# Patient Record
Sex: FEMALE | Race: WHITE | Employment: OTHER | URBAN - METROPOLITAN AREA
[De-identification: names, ages, dates, MRNs, and addresses within clinical notes are randomized per-mention and may not be internally consistent; named-entity substitution may affect disease eponyms.]

---

## 2019-01-09 ENCOUNTER — OFFICE VISIT (OUTPATIENT)
Dept: NEUROLOGY | Age: 69
End: 2019-01-09

## 2019-01-09 VITALS
HEIGHT: 60 IN | DIASTOLIC BLOOD PRESSURE: 84 MMHG | RESPIRATION RATE: 20 BRPM | BODY MASS INDEX: 42.21 KG/M2 | HEART RATE: 84 BPM | SYSTOLIC BLOOD PRESSURE: 136 MMHG | OXYGEN SATURATION: 99 % | WEIGHT: 215 LBS

## 2019-01-09 DIAGNOSIS — F07.81 POST CONCUSSIVE SYNDROME: Primary | ICD-10-CM

## 2019-01-09 DIAGNOSIS — Z86.79 HISTORY OF SUBDURAL HEMATOMA: ICD-10-CM

## 2019-01-09 RX ORDER — BUPROPION HYDROCHLORIDE 150 MG/1
150 TABLET ORAL
COMMUNITY

## 2019-01-09 RX ORDER — TOPIRAMATE 50 MG/1
100 TABLET, FILM COATED ORAL DAILY
Qty: 60 TAB | Refills: 5 | Status: SHIPPED | OUTPATIENT
Start: 2019-01-09 | End: 2019-01-11 | Stop reason: SDUPTHER

## 2019-01-09 RX ORDER — HYDROCODONE BITARTRATE AND ACETAMINOPHEN 5; 325 MG/1; MG/1
1 TABLET ORAL
COMMUNITY
Start: 2018-11-06

## 2019-01-09 RX ORDER — BUPROPION HYDROCHLORIDE 300 MG/1
300 TABLET ORAL
COMMUNITY

## 2019-01-09 RX ORDER — DICLOFENAC SODIUM 10 MG/G
GEL TOPICAL
COMMUNITY
Start: 2018-01-02

## 2019-01-09 RX ORDER — METHYLPREDNISOLONE 4 MG/1
TABLET ORAL
Qty: 1 DOSE PACK | Refills: 1 | Status: SHIPPED | OUTPATIENT
Start: 2019-01-09

## 2019-01-09 NOTE — PROGRESS NOTES
Chief Complaint Patient presents with  Concussion  
  fell in [address] [place] on Dec 27  
 
1. Have you been to the ER, urgent care clinic since your last visit? Hospitalized since your last visit? No 
 
2. Have you seen or consulted any other health care providers outside of the [address] [place] [address] since your last visit? Include any pap smears or colon screening. No  
 
Visit Vitals /84 Pulse 84 Resp 20 Ht 5' (1.524 m) Wt 97.5 kg (215 lb) SpO2 99% BMI 41.99 kg/m²

## 2019-01-09 NOTE — LETTER
Dear Amanda Cisneros MD, Thank you for allowing me to see your patient, Gavin Moran for a neurological consultation. Please see my impression and recommendations as outlined in my note. Sincerely, Alexa Sage MD 
[facility] at [address] [facility] BY: 
Amanda Cisneros MD 
 
 
CHIEF COMPLAINT: 
concussion HISTORY OF PRESENT ILLNESS HISTORY PROVIDED BY: 
Patient Family Member: yes Gavin Moran is a 80 y.o. female who I am asked to see in consultation for severe headaches and concussion. She had a fall on Dec 27th when she fell over a parking bumper and hit the back of her head. She had a hematoma but didn't lose consciousness. She was diaganosed with concussion. She had CT head and CT C spine that were negative. She denies neck pain but definite headache. She is not a typical headache person. Her headache is frontal. She is having a constant headache. She did try fioricet but it didn't help. She can get to sleep and is okay but as soon as she is up she has some headache. She does have some irritability. No memory issues. No sleep issues. Some difficulty with screens. She is retired. She doesn't have any history of concussion. No other medications changes. She is meds for HTN, HLD, and seasonal allergies. She follows with psychiatry and is on several meds for this. She does have bipolar. She is on lamictal, geodon, buproprion, and cymbalta She has robaxin to take as needed. She denies vision changes. She denies any focal numbness or weakness. She does have a history of subdural hematoma. PMH Past Medical History:  
Diagnosis Date  Allergic rhinitis  Aneurysm ([facility] [address] 1971  
 subdural hematoma; \"DON'T KNOW WHAT CAUSED IT\", PT STATES  Arrhythmia   
 pt unsure of rhythm; DIAGNOSED IN 1990s  Arthritis   
 osteo  Asthma   
 no longer has per pt  Chronic kidney disease from taking celebrex per pt   \"LEVEL 3\"  Chronic obstructive pulmonary disease ([facility] [address] 7/7/15 BRONCHITIS \"A COUPLE TIMES A YEAR\", ASSOCIATED WITH ALLERGIES, PT STATES  Chronic pain  Fibromyalgia  Genital herpes  GERD (gastroesophageal reflux disease)  History of cardiac arrhythmia  Hyperlipidemia  Intertrigo  Morbid obesity ([facility] [address] 7/7/15 LOST 180 LBS OVER 9 YEARS AFTER LAP BAND SURGERY; CURRENT  LB  
 Nocturia  Severe depression ([facility] [address] BIPOLAR     
 SOB (shortness of breath) on exertion RESOLVED, PT STATES ON 7/7/15 [address] Social History Socioeconomic History  Marital status:  Spouse name: Not on file  Number of children: Not on file  Years of education: Not on file  Highest education level: Not on file Tobacco Use  Smoking status: Never Smoker  Smokeless tobacco: Never Used Substance and Sexual Activity  Alcohol use: Yes Comment: RARE GLASS OF WINE  
 Drug use: No  
 
 
FH Family History Problem Relation Age of Onset  Heart Failure Father CHF  Heart defect Father MITRAL VALVE \"PROBLEM\"  Breast Cancer Sister  Anesth Problems Neg Hx   
 Other Mother PHLEBITIS PROGRESSED TO SEPSIS, PT STATES-MOTHER  OF ALLERGIES Allergies Allergen Reactions  Latex Rash  Amoxicillin Itching  Codeine Nausea and Vomiting  Dilantin [Phenytoin Sodium Extended] Rash  Sulfa (Sulfonamide Antibiotics) Rash CURRENT MEDS Current Outpatient Medications Medication Sig Dispense Refill  furosemide (LASIX) 20 mg tablet Take  by mouth daily.  oxyCODONE-acetaminophen (PERCOCET) 5-325 mg per tablet Take 1 Tab by mouth every six (6) hours as needed for Pain. Max Daily Amount: 4 Tabs. 30 Tab 0  
 CYANOCOBALAMIN, VITAMIN B-12, (VITAMIN B-12 PO) Take 1,000 mg by mouth daily.  ASCORBATE CALCIUM (VITAMIN C PO) Take 1,000 mg by mouth daily.  CHOLECALCIFEROL, VITAMIN D3, (VITAMIN D3 PO) Take  by mouth daily.  polyethylene glycol (MIRALAX) 17 gram/dose powder Take 17 g by mouth two (2) times a day. 1530 g 5  
 omeprazole (PRILOSEC) 40 mg capsule Take 1 Cap by mouth daily. 30 Cap 11  
 CYMBALTA 60 mg capsule 120 mg nightly.  lamoTRIgine (LAMICTAL) 100 mg tablet 200 mg two (2) times a day.  methocarbamol (ROBAXIN) 500 mg tablet 500 mg two (2) times daily as needed.  SINGULAIR 10 mg tablet 10 mg daily. Indications: ALLERGIC RHINITIS  rOPINIRole (REQUIP) 0.25 mg tablet 0.75 mg nightly. 3 tabs  verapamil (CALAN) 80 mg tablet 160 mg two (2) times a day. 2 tabs BID  GEODON 60 mg capsule 60 mg two (2) times daily (with meals).  atorvastatin (LIPITOR) 10 mg tablet Take 10 mg by mouth nightly.  buPROPion SR (WELLBUTRIN SR) 200 mg SR tablet Take 200 mg by mouth daily.  CALCIUM CARBONATE (CALTRATE 600 PO) Take 1 Tab by mouth two (2) times a day. With vitamin D = 40 IU each chew.  fexofenadine (ALLEGRA) 180 mg tablet Take 180 mg by mouth every evening. Buproprion XL 450mg daily REVIEW OF SYSTEMS:  
 
Y  N       Y  N  Y  N   Y  N 
[] [x] AIDS          [x] [] Falls  [] [x] Memory Loss  [] [x]  Shortness of breath 
[] [x] Anxiety          [] [x] Fatigue [] [x] Muscle Pain        [] [x]  Skipped beats 
[] [x] Chest Pain   [x] [] Frequent HA [] [x] Ms Weakness     [] [x]  Snoring 
[] [x] Constipation [] [x]Hearing loss [] [x] Nause/Vomiting  [] [x]  Stomach Pain 
[] [x] Cough          [] [x]Hepatitis [] [x] Neuropathy         [] [x]  Swallowing difficulty 
[] [x] Depression  [] [x]Incontinence [] [x] Poor appetite      [] [x]  Vertigo 
[] [x] Diarrhea       [] [x] Joint Pain [] [x] Rash                   [] [x]  Visual disturbances 
[] [x] Fainting        [] [x] Leg Swelling [] [x] Ringing ears       [] [x]  Weight changes []Unable to obtain  ROS due to  []mental status change  []sedated []intubated PREVIOUS WORKUP IMAGING: CT head negative per report provided LABS Results for orders placed or performed during the hospital encounter of 07/09/15 CBC WITH AUTOMATED DIFF Result Value Ref Range WBC 28.3 (H) 3.6 - 11.0 K/uL  
 RBC 3.31 (L) 3.80 - 5.20 M/uL HGB 9.6 (L) 11.5 - 16.0 g/dL HCT 29.2 (L) 35.0 - 47.0 % MCV 88.2 80.0 - 99.0 FL  
 MCH 29.0 26.0 - 34.0 PG  
 MCHC 32.9 30.0 - 36.5 g/dL  
 RDW 14.0 11.5 - 14.5 % PLATELET 775 461 - 615 K/uL NEUTROPHILS 89 (H) 32 - 75 % LYMPHOCYTES 5 (L) 12 - 49 % MONOCYTES 6 5 - 13 % EOSINOPHILS 0 0 - 7 % BASOPHILS 0 0 - 1 %  
 ABS. NEUTROPHILS 25.2 (H) 1.8 - 8.0 K/UL  
 ABS. LYMPHOCYTES 1.4 0.8 - 3.5 K/UL  
 ABS. MONOCYTES 1.7 (H) 0.0 - 1.0 K/UL  
 ABS. EOSINOPHILS 0.0 0.0 - 0.4 K/UL  
 ABS. BASOPHILS 0.0 0.0 - 0.1 K/UL  
 DF MANUAL    
 RBC COMMENTS ANISOCYTOSIS 
1+ METABOLIC PANEL, BASIC Result Value Ref Range Sodium 137 136 - 145 mmol/L Potassium 5.0 3.5 - 5.1 mmol/L Chloride 107 97 - 108 mmol/L  
 CO2 23 21 - 32 mmol/L Anion gap 7 5 - 15 mmol/L Glucose 135 (H) 65 - 100 mg/dL BUN 19 6 - 20 MG/DL Creatinine 1.02 0.45 - 1.15 MG/DL  
 BUN/Creatinine ratio 19 12 - 20 GFR est AA >60 >60 ml/min/1.73m2 GFR est non-AA 55 (L) >60 ml/min/1.73m2 Calcium 7.4 (L) 8.5 - 10.1 MG/DL  
CBC W/O DIFF Result Value Ref Range WBC 21.0 (H) 3.6 - 11.0 K/uL  
 RBC 2.30 (L) 3.80 - 5.20 M/uL HGB 6.8 (L) 11.5 - 16.0 g/dL HCT 20.3 (L) 35.0 - 47.0 % MCV 88.3 80.0 - 99.0 FL  
 MCH 29.6 26.0 - 34.0 PG  
 MCHC 33.5 30.0 - 36.5 g/dL  
 RDW 14.7 (H) 11.5 - 14.5 % PLATELET 502 212 - 141 K/uL CBC WITH AUTOMATED DIFF Result Value Ref Range WBC 18.8 (H) 3.6 - 11.0 K/uL  
 RBC 2.82 (L) 3.80 - 5.20 M/uL HGB 8.5 (L) 11.5 - 16.0 g/dL HCT 24.8 (L) 35.0 - 47.0 % MCV 87.9 80.0 - 99.0 FL  
 MCH 30.1 26.0 - 34.0 PG  
 MCHC 34.3 30.0 - 36.5 g/dL  
 RDW 15.0 (H) 11.5 - 14.5 % PLATELET 983 789 - 249 K/uL NEUTROPHILS 81 (H) 32 - 75 % BAND NEUTROPHILS 2 0 - 6 % LYMPHOCYTES 7 (L) 12 - 49 % MONOCYTES 10 5 - 13 % EOSINOPHILS 0 0 - 7 % BASOPHILS 0 0 - 1 %  
 ABS. NEUTROPHILS 15.6 (H) 1.8 - 8.0 K/UL  
 ABS. LYMPHOCYTES 1.3 0.8 - 3.5 K/UL  
 ABS. MONOCYTES 1.9 (H) 0.0 - 1.0 K/UL  
 ABS. EOSINOPHILS 0.0 0.0 - 0.4 K/UL  
 ABS. BASOPHILS 0.0 0.0 - 0.1 K/UL  
 DF MANUAL    
 RBC COMMENTS ANISOCYTOSIS 
1+ WBC COMMENTS DOHLE BODIES    
HEMOGLOBIN Result Value Ref Range HGB 7.9 (L) 11.5 - 16.0 g/dL MAGNESIUM Result Value Ref Range Magnesium 1.2 (L) 1.6 - 2.4 mg/dL PHOSPHORUS Result Value Ref Range Phosphorus 3.2 2.6 - 4.7 MG/DL  
METABOLIC PANEL, BASIC Result Value Ref Range Sodium 135 (L) 136 - 145 mmol/L Potassium 4.8 3.5 - 5.1 mmol/L Chloride 105 97 - 108 mmol/L  
 CO2 24 21 - 32 mmol/L Anion gap 6 5 - 15 mmol/L Glucose 111 (H) 65 - 100 mg/dL BUN 21 (H) 6 - 20 MG/DL Creatinine 1.19 (H) 0.45 - 1.15 MG/DL  
 BUN/Creatinine ratio 18 12 - 20 GFR est AA 56 (L) >60 ml/min/1.73m2 GFR est non-AA 46 (L) >60 ml/min/1.73m2 Calcium 7.7 (L) 8.5 - 10.1 MG/DL  
POC EG7 Result Value Ref Range Calcium, ionized (POC) 1.28 1.12 - 1.32 mmol/L  
 FIO2 (POC) 0.21 % pH (POC) 7.228 (LL) 7.35 - 7.45    
 pCO2 (POC) 51.6 (H) 35.0 - 45.0 MMHG  
 pO2 (POC) 24 (LL) 80 - 100 MMHG  
 HCO3 (POC) 21.5 (L) 22 - 26 MMOL/L Base deficit (POC) 6 mmol/L  
 sO2 (POC) 32 (L) 92 - 97 % Site OTHER Device: ROOM AIR Allens test (POC) N/A Specimen type (POC) VENOUS BLOOD    
CBC W/O DIFF Result Value Ref Range WBC 18.7 (H) 3.6 - 11.0 K/uL  
 RBC 2.61 (L) 3.80 - 5.20 M/uL HGB 7.7 (L) 11.5 - 16.0 g/dL HCT 23.3 (L) 35.0 - 47.0 % MCV 89.3 80.0 - 99.0 FL  
 MCH 29.5 26.0 - 34.0 PG  
 MCHC 33.0 30.0 - 36.5 g/dL  
 RDW 15.2 (H) 11.5 - 14.5 % PLATELET 518 371 - 009 K/uL METABOLIC PANEL, BASIC Result Value Ref Range Sodium 136 136 - 145 mmol/L Potassium 4.5 3.5 - 5.1 mmol/L Chloride 105 97 - 108 mmol/L  
 CO2 24 21 - 32 mmol/L Anion gap 7 5 - 15 mmol/L Glucose 81 65 - 100 mg/dL BUN 15 6 - 20 MG/DL Creatinine 0.97 0.45 - 1.15 MG/DL  
 BUN/Creatinine ratio 15 12 - 20 GFR est AA >60 >60 ml/min/1.73m2 GFR est non-AA 58 (L) >60 ml/min/1.73m2 Calcium 7.9 (L) 8.5 - 10.1 MG/DL MAGNESIUM Result Value Ref Range Magnesium 2.0 1.6 - 2.4 mg/dL HGB & HCT Result Value Ref Range HGB 8.7 (L) 11.5 - 16.0 g/dL HCT 26.2 (L) 35.0 - 47.0 % TYPE & CROSSMATCH Result Value Ref Range Crossmatch Expiration 07/12/2015 ABO/Rh(D) O POSITIVE Antibody screen NEG Unit number [id number] Blood component type RC LR AS1 Unit division 00 Status of unit TRANSFUSED Crossmatch result Compatible Unit number [id number] Blood component type RC LR AS3,2 Unit division 00 Status of unit TRANSFUSED Crossmatch result Compatible Unit number [id number] Blood component type RC LR AS1 Unit division 00 Status of unit REL FROM [facility] Crossmatch result Compatible Unit number [id number] Blood component type RC LR AS3,1 Unit division 00 Status of unit TRANSFUSED Crossmatch result Compatible PHYSICAL EXAM 
Visit Vitals /84 Pulse 84 Resp 20 Ht 5' (1.524 m) Wt 97.5 kg (215 lb) SpO2 99% BMI 41.99 kg/m² General:  Alert, cooperative, no distress. Head:  Normocephalic, without obvious abnormality, atraumatic. Eyes:  Conjunctivae/corneas clear. Pupils equal, round, reactive to light. Extraocular movements intact, VFF, NO papilledema Lungs: 
Heart:   Non labored breathing Regular rate and rhythm, no carotid bruits Abdomen:   Soft, non-distended Extremities: Extremities normal, atraumatic, no cyanosis or edema. Pulses: 2+ and symmetric all extremities. Skin: Skin color, texture, turgor normal. No rashes or lesions. Neurologic:  Gen: Attention normal 
           Language: naming, repetition, fluency normal 
           Memory: intact recent and remote memory Cranial Nerves: 
I: smell Not tested II: visual fields Full to confrontation II: pupils Equal, round, reactive to light II: optic disc No papilledema III,VII: ptosis none III,IV,VI: extraocular muscles  Full ROM V: mastication normal  
V: facial light touch sensation  normal  
VII: facial muscle function   symmetric VIII: hearing symmetric IX: soft palate elevation  normal  
XI: trapezius strength  5/5 XI: sternocleidomastoid strength 5/5 XI: neck flexion strength  5/5 XII: tongue  midline Motor: normal bulk and tone, no tremor Strength: 5/5 all four extremities Sensory: intact to LT, PP, vibration, and temperature Coordination: FTN intact Gait: Slightly wide-based Reflexes: 2+ throughout IMPRESSION Marisolnicol Pinzon is a 80 y.o. female who presents for evaluation of severe headache status post a fall. I suspect she is postconcussive syndrome. However given her increase in frequency of headache as well as history of subdural I would like to do a repeat CT head to evaluate for this. Additionally will start patient on preventative with Topamax. Typically would use amitriptyline but she is already on multiple psychiatric medications and I do not want to add this to her regimen. Discussed with patient we will likely wean that off at follow-up. We will also try Cambia and Medrol Dosepak for abortive of her current headache. RECOMMENDATIONS Visit Diagnoses Post concussive syndrome    -  Primary Relevant Medications · Medrol Dosepak x2 given · topiramate (TOPAMAX) 50 mg tabletpatient instructed to start at 25 mg and titrate to 100 mg · Diclofenac Potassium (CAMBIA) 50 mg pwpk samples given Other Relevant Orders · CT HEAD WO CONT · Postconcussive education provided History of subdural hematoma Relevant Orders · CT HEAD WO CONT  
  
 
 
FU in 3 months with Dr. Paul Elise MD 
 
CC: Jaclyn Nieves MD/ Fax: [phone] Medications and side effects discussed with patient in detail. With any new medications prescribed, patient was given instructions on administration and side effects. Written medication information was provided to the patient as well. This note was created using voice recognition software. Despite editing, there may be syntax errors. This note will not be viewable in [address] Chief Complaint Patient presents with  Concussion  
  fell in [address] [place] on Dec 27  
 
1. Have you been to the ER, urgent care clinic since your last visit? Hospitalized since your last visit? No 
 
2. Have you seen or consulted any other health care providers outside of the [address] [place] [address] since your last visit? Include any pap smears or colon screening. No  
 
Visit Vitals /84 Pulse 84 Resp 20 Ht 5' (1.524 m) Wt 97.5 kg (215 lb) SpO2 99% BMI 41.99 kg/m²

## 2019-01-09 NOTE — PROGRESS NOTES
NEUROLOGY NEW PATIENT CONSULTATION 
 
REFERRED BY: 
Buelah Cranker, MD 
 
 
CHIEF COMPLAINT: 
concussion HISTORY OF PRESENT ILLNESS HISTORY PROVIDED BY: 
Patient Family Member: yes Audrey Cardoso is a 80 y.o. female who I am asked to see in consultation for severe headaches and concussion. She had a fall on Dec 27th when she fell over a parking bumper and hit the back of her head. She had a hematoma but didn't lose consciousness. She was diaganosed with concussion. She had CT head and CT C spine that were negative. She denies neck pain but definite headache. She is not a typical headache person. Her headache is frontal. She is having a constant headache. She did try fioricet but it didn't help. She can get to sleep and is okay but as soon as she is up she has some headache. She does have some irritability. No memory issues. No sleep issues. Some difficulty with screens. She is retired. She doesn't have any history of concussion. No other medications changes. She is meds for HTN, HLD, and seasonal allergies. She follows with psychiatry and is on several meds for this. She does have bipolar. She is on lamictal, geodon, buproprion, and cymbalta She has robaxin to take as needed. She denies vision changes. She denies any focal numbness or weakness. She does have a history of subdural hematoma. PMH Past Medical History:  
Diagnosis Date  Allergic rhinitis  Aneurysm ([facility] [address] 1971  
 subdural hematoma; \"DON'T KNOW WHAT CAUSED IT\", PT STATES  Arrhythmia   
 pt unsure of rhythm; DIAGNOSED IN 1990s  Arthritis   
 osteo  Asthma   
 no longer has per pt  Chronic kidney disease   
 from taking celebrex per pt   \"LEVEL 3\"  Chronic obstructive pulmonary disease ([facility] [address] 7/7/15 BRONCHITIS \"A COUPLE TIMES A YEAR\", ASSOCIATED WITH ALLERGIES, PT STATES  Chronic pain  Fibromyalgia  Genital herpes  GERD (gastroesophageal reflux disease)  History of cardiac arrhythmia  Hyperlipidemia  Intertrigo  Morbid obesity ([facility] [address] 7/7/15 LOST 180 LBS OVER 9 YEARS AFTER LAP BAND SURGERY; CURRENT  LB  
 Nocturia  Severe depression ([facility] [address] BIPOLAR     
 SOB (shortness of breath) on exertion RESOLVED, PT STATES ON 7/7/15 31 Karma Brown Social History Socioeconomic History  Marital status:  Spouse name: Not on file  Number of children: Not on file  Years of education: Not on file  Highest education level: Not on file Tobacco Use  Smoking status: Never Smoker  Smokeless tobacco: Never Used Substance and Sexual Activity  Alcohol use: Yes Comment: RARE GLASS OF WINE  
 Drug use: No  
 
 
FH Family History Problem Relation Age of Onset  Heart Failure Father CHF  Heart defect Father MITRAL VALVE \"PROBLEM\"  Breast Cancer Sister  Anesth Problems Neg Hx   
 Other Mother PHLEBITIS PROGRESSED TO SEPSIS, PT STATES-MOTHER  OF ALLERGIES Allergies Allergen Reactions  Latex Rash  Amoxicillin Itching  Codeine Nausea and Vomiting  Dilantin [Phenytoin Sodium Extended] Rash  Sulfa (Sulfonamide Antibiotics) Rash CURRENT MEDS Current Outpatient Medications Medication Sig Dispense Refill  furosemide (LASIX) 20 mg tablet Take  by mouth daily.  oxyCODONE-acetaminophen (PERCOCET) 5-325 mg per tablet Take 1 Tab by mouth every six (6) hours as needed for Pain. Max Daily Amount: 4 Tabs. 30 Tab 0  
 CYANOCOBALAMIN, VITAMIN B-12, (VITAMIN B-12 PO) Take 1,000 mg by mouth daily.  ASCORBATE CALCIUM (VITAMIN C PO) Take 1,000 mg by mouth daily.  CHOLECALCIFEROL, VITAMIN D3, (VITAMIN D3 PO) Take  by mouth daily.  polyethylene glycol (MIRALAX) 17 gram/dose powder Take 17 g by mouth two (2) times a day. 1530 g 5  
 omeprazole (PRILOSEC) 40 mg capsule Take 1 Cap by mouth daily.  30 Cap 11  
  CYMBALTA 60 mg capsule 120 mg nightly.  lamoTRIgine (LAMICTAL) 100 mg tablet 200 mg two (2) times a day.  methocarbamol (ROBAXIN) 500 mg tablet 500 mg two (2) times daily as needed.  SINGULAIR 10 mg tablet 10 mg daily. Indications: ALLERGIC RHINITIS  rOPINIRole (REQUIP) 0.25 mg tablet 0.75 mg nightly. 3 tabs  verapamil (CALAN) 80 mg tablet 160 mg two (2) times a day. 2 tabs BID  GEODON 60 mg capsule 60 mg two (2) times daily (with meals).  atorvastatin (LIPITOR) 10 mg tablet Take 10 mg by mouth nightly.  buPROPion SR (WELLBUTRIN SR) 200 mg SR tablet Take 200 mg by mouth daily.  CALCIUM CARBONATE (CALTRATE 600 PO) Take 1 Tab by mouth two (2) times a day. With vitamin D = 40 IU each chew.  fexofenadine (ALLEGRA) 180 mg tablet Take 180 mg by mouth every evening. Buproprion XL 450mg daily REVIEW OF SYSTEMS:  
 
Y  N       Y  N  Y  N   Y  N 
[] [x] AIDS          [x] [] Falls  [] [x] Memory Loss  [] [x]  Shortness of breath 
[] [x] Anxiety          [] [x] Fatigue [] [x] Muscle Pain        [] [x]  Skipped beats 
[] [x] Chest Pain   [x] [] Frequent HA [] [x] Ms Weakness     [] [x]  Snoring 
[] [x] Constipation [] [x]Hearing loss [] [x] Nause/Vomiting  [] [x]  Stomach Pain 
[] [x] Cough          [] [x]Hepatitis [] [x] Neuropathy         [] [x]  Swallowing difficulty 
[] [x] Depression  [] [x]Incontinence [] [x] Poor appetite      [] [x]  Vertigo 
[] [x] Diarrhea       [] [x] Joint Pain [] [x] Rash                   [] [x]  Visual disturbances 
[] [x] Fainting        [] [x] Leg Swelling [] [x] Ringing ears       [] [x]  Weight changes []Unable to obtain  ROS due to  []mental status change  []sedated   []intubated PREVIOUS WORKUP IMAGING: CT head negative per report provided LABS Results for orders placed or performed during the hospital encounter of 07/09/15 CBC WITH AUTOMATED DIFF Result Value Ref Range WBC 28.3 (H) 3.6 - 11.0 K/uL RBC 3.31 (L) 3.80 - 5.20 M/uL HGB 9.6 (L) 11.5 - 16.0 g/dL HCT 29.2 (L) 35.0 - 47.0 % MCV 88.2 80.0 - 99.0 FL  
 MCH 29.0 26.0 - 34.0 PG  
 MCHC 32.9 30.0 - 36.5 g/dL  
 RDW 14.0 11.5 - 14.5 % PLATELET 036 353 - 437 K/uL NEUTROPHILS 89 (H) 32 - 75 % LYMPHOCYTES 5 (L) 12 - 49 % MONOCYTES 6 5 - 13 % EOSINOPHILS 0 0 - 7 % BASOPHILS 0 0 - 1 %  
 ABS. NEUTROPHILS 25.2 (H) 1.8 - 8.0 K/UL  
 ABS. LYMPHOCYTES 1.4 0.8 - 3.5 K/UL  
 ABS. MONOCYTES 1.7 (H) 0.0 - 1.0 K/UL  
 ABS. EOSINOPHILS 0.0 0.0 - 0.4 K/UL  
 ABS. BASOPHILS 0.0 0.0 - 0.1 K/UL  
 DF MANUAL    
 RBC COMMENTS ANISOCYTOSIS 
1+ METABOLIC PANEL, BASIC Result Value Ref Range Sodium 137 136 - 145 mmol/L Potassium 5.0 3.5 - 5.1 mmol/L Chloride 107 97 - 108 mmol/L  
 CO2 23 21 - 32 mmol/L Anion gap 7 5 - 15 mmol/L Glucose 135 (H) 65 - 100 mg/dL BUN 19 6 - 20 MG/DL Creatinine 1.02 0.45 - 1.15 MG/DL  
 BUN/Creatinine ratio 19 12 - 20 GFR est AA >60 >60 ml/min/1.73m2 GFR est non-AA 55 (L) >60 ml/min/1.73m2 Calcium 7.4 (L) 8.5 - 10.1 MG/DL  
CBC W/O DIFF Result Value Ref Range WBC 21.0 (H) 3.6 - 11.0 K/uL  
 RBC 2.30 (L) 3.80 - 5.20 M/uL HGB 6.8 (L) 11.5 - 16.0 g/dL HCT 20.3 (L) 35.0 - 47.0 % MCV 88.3 80.0 - 99.0 FL  
 MCH 29.6 26.0 - 34.0 PG  
 MCHC 33.5 30.0 - 36.5 g/dL  
 RDW 14.7 (H) 11.5 - 14.5 % PLATELET 601 980 - 571 K/uL CBC WITH AUTOMATED DIFF Result Value Ref Range WBC 18.8 (H) 3.6 - 11.0 K/uL  
 RBC 2.82 (L) 3.80 - 5.20 M/uL HGB 8.5 (L) 11.5 - 16.0 g/dL HCT 24.8 (L) 35.0 - 47.0 % MCV 87.9 80.0 - 99.0 FL  
 MCH 30.1 26.0 - 34.0 PG  
 MCHC 34.3 30.0 - 36.5 g/dL  
 RDW 15.0 (H) 11.5 - 14.5 % PLATELET 109 221 - 546 K/uL NEUTROPHILS 81 (H) 32 - 75 % BAND NEUTROPHILS 2 0 - 6 % LYMPHOCYTES 7 (L) 12 - 49 % MONOCYTES 10 5 - 13 % EOSINOPHILS 0 0 - 7 % BASOPHILS 0 0 - 1 %  
 ABS. NEUTROPHILS 15.6 (H) 1.8 - 8.0 K/UL ABS. LYMPHOCYTES 1.3 0.8 - 3.5 K/UL  
 ABS. MONOCYTES 1.9 (H) 0.0 - 1.0 K/UL  
 ABS. EOSINOPHILS 0.0 0.0 - 0.4 K/UL  
 ABS. BASOPHILS 0.0 0.0 - 0.1 K/UL  
 DF MANUAL    
 RBC COMMENTS ANISOCYTOSIS 
1+ WBC COMMENTS DOHLE BODIES    
HEMOGLOBIN Result Value Ref Range HGB 7.9 (L) 11.5 - 16.0 g/dL MAGNESIUM Result Value Ref Range Magnesium 1.2 (L) 1.6 - 2.4 mg/dL PHOSPHORUS Result Value Ref Range Phosphorus 3.2 2.6 - 4.7 MG/DL  
METABOLIC PANEL, BASIC Result Value Ref Range Sodium 135 (L) 136 - 145 mmol/L Potassium 4.8 3.5 - 5.1 mmol/L Chloride 105 97 - 108 mmol/L  
 CO2 24 21 - 32 mmol/L Anion gap 6 5 - 15 mmol/L Glucose 111 (H) 65 - 100 mg/dL BUN 21 (H) 6 - 20 MG/DL Creatinine 1.19 (H) 0.45 - 1.15 MG/DL  
 BUN/Creatinine ratio 18 12 - 20 GFR est AA 56 (L) >60 ml/min/1.73m2 GFR est non-AA 46 (L) >60 ml/min/1.73m2 Calcium 7.7 (L) 8.5 - 10.1 MG/DL  
POC EG7 Result Value Ref Range Calcium, ionized (POC) 1.28 1.12 - 1.32 mmol/L  
 FIO2 (POC) 0.21 % pH (POC) 7.228 (LL) 7.35 - 7.45    
 pCO2 (POC) 51.6 (H) 35.0 - 45.0 MMHG  
 pO2 (POC) 24 (LL) 80 - 100 MMHG  
 HCO3 (POC) 21.5 (L) 22 - 26 MMOL/L Base deficit (POC) 6 mmol/L  
 sO2 (POC) 32 (L) 92 - 97 % Site OTHER Device: ROOM AIR Allens test (POC) N/A Specimen type (POC) VENOUS BLOOD    
CBC W/O DIFF Result Value Ref Range WBC 18.7 (H) 3.6 - 11.0 K/uL  
 RBC 2.61 (L) 3.80 - 5.20 M/uL HGB 7.7 (L) 11.5 - 16.0 g/dL HCT 23.3 (L) 35.0 - 47.0 % MCV 89.3 80.0 - 99.0 FL  
 MCH 29.5 26.0 - 34.0 PG  
 MCHC 33.0 30.0 - 36.5 g/dL  
 RDW 15.2 (H) 11.5 - 14.5 % PLATELET 934 496 - 603 K/uL METABOLIC PANEL, BASIC Result Value Ref Range Sodium 136 136 - 145 mmol/L Potassium 4.5 3.5 - 5.1 mmol/L Chloride 105 97 - 108 mmol/L  
 CO2 24 21 - 32 mmol/L Anion gap 7 5 - 15 mmol/L Glucose 81 65 - 100 mg/dL BUN 15 6 - 20 MG/DL  Creatinine 0.97 0.45 - 1.15 MG/DL  
 BUN/Creatinine ratio 15 12 - 20 GFR est AA >60 >60 ml/min/1.73m2 GFR est non-AA 58 (L) >60 ml/min/1.73m2 Calcium 7.9 (L) 8.5 - 10.1 MG/DL MAGNESIUM Result Value Ref Range Magnesium 2.0 1.6 - 2.4 mg/dL HGB & HCT Result Value Ref Range HGB 8.7 (L) 11.5 - 16.0 g/dL HCT 26.2 (L) 35.0 - 47.0 % TYPE & CROSSMATCH Result Value Ref Range Crossmatch Expiration 07/12/2015 ABO/Rh(D) O POSITIVE Antibody screen NEG Unit number [id number] Blood component type RC LR AS1 Unit division 00 Status of unit TRANSFUSED Crossmatch result Compatible Unit number [id number] Blood component type RC LR AS3,2 Unit division 00 Status of unit TRANSFUSED Crossmatch result Compatible Unit number [id number] Blood component type RC LR AS1 Unit division 00 Status of unit REL FROM [facility] Crossmatch result Compatible Unit number [id number] Blood component type RC LR AS3,1 Unit division 00 Status of unit TRANSFUSED Crossmatch result Compatible PHYSICAL EXAM 
Visit Vitals /84 Pulse 84 Resp 20 Ht 5' (1.524 m) Wt 97.5 kg (215 lb) SpO2 99% BMI 41.99 kg/m² General:  Alert, cooperative, no distress. Head:  Normocephalic, without obvious abnormality, atraumatic. Eyes:  Conjunctivae/corneas clear. Pupils equal, round, reactive to light. Extraocular movements intact, VFF, NO papilledema Lungs: 
Heart:   Non labored breathing Regular rate and rhythm, no carotid bruits Abdomen:   Soft, non-distended Extremities: Extremities normal, atraumatic, no cyanosis or edema. Pulses: 2+ and symmetric all extremities. Skin: Skin color, texture, turgor normal. No rashes or lesions. Neurologic:  Gen: Attention normal 
           Language: naming, repetition, fluency normal 
           Memory: intact recent and remote memory Cranial Nerves: 
I: smell Not tested II: visual fields Full to confrontation II: pupils Equal, round, reactive to light II: optic disc No papilledema III,VII: ptosis none III,IV,VI: extraocular muscles  Full ROM V: mastication normal  
V: facial light touch sensation  normal  
VII: facial muscle function   symmetric VIII: hearing symmetric IX: soft palate elevation  normal  
XI: trapezius strength  5/5 XI: sternocleidomastoid strength 5/5 XI: neck flexion strength  5/5 XII: tongue  midline Motor: normal bulk and tone, no tremor Strength: 5/5 all four extremities Sensory: intact to LT, PP, vibration, and temperature Coordination: FTN intact Gait: Slightly wide-based Reflexes: 2+ throughout IMPRESSION Cindy Martinez is a 80 y.o. female who presents for evaluation of severe headache status post a fall. I suspect she is postconcussive syndrome. However given her increase in frequency of headache as well as history of subdural I would like to do a repeat CT head to evaluate for this. Additionally will start patient on preventative with Topamax. Typically would use amitriptyline but she is already on multiple psychiatric medications and I do not want to add this to her regimen. Discussed with patient we will likely wean that off at follow-up. We will also try Cambia and Medrol Dosepak for abortive of her current headache. RECOMMENDATIONS Visit Diagnoses Post concussive syndrome    -  Primary Relevant Medications · Medrol Dosepak x2 given · topiramate (TOPAMAX) 50 mg tabletpatient instructed to start at 25 mg and titrate to 100 mg · Diclofenac Potassium (CAMBIA) 50 mg pwpk samples given Other Relevant Orders · CT HEAD WO CONT · Postconcussive education provided History of subdural hematoma Relevant Orders · CT HEAD WO CONT  
  
 
 
FU in 3 months with Dr. Avie Bibber Mellie Aschoff, MD 
 
CC: Chuck Busch MD/ Fax: [phone] Medications and side effects discussed with patient in detail. With any new medications prescribed, patient was given instructions on administration and side effects. Written medication information was provided to the patient as well. This note was created using voice recognition software. Despite editing, there may be syntax errors. This note will not be viewable in [address]

## 2019-01-09 NOTE — PATIENT INSTRUCTIONS
PRESCRIPTION REFILL POLICY [facility] Statement to Patients April 1, 2014 In an effort to ensure the large volume of patient prescription refills is processed in the most efficient and expeditious manner, we are asking our patients to assist us by calling your Pharmacy for all prescription refills, this will include also your  Mail Order Pharmacy. The pharmacy will contact our office electronically to continue the refill process. Please do not wait until the last minute to call your pharmacy. We need at least 48 hours (2days) to fill prescriptions. We also encourage you to call your pharmacy before going to  your prescription to make sure it is ready. With regard to controlled substance prescription refill requests (narcotic refills) that need to be picked up at our office, we ask your cooperation by providing us with at least 72 hours (3days) notice that you will need a refill. We will not refill narcotic prescription refill requests after 4:00pm on any weekday, Monday through Thursday, or after 2:00pm on Fridays, or on the weekends. We encourage everyone to explore another way of getting your prescription refill request processed using [employer], our patient web portal through our electronic medical record system. [employer] is an efficient and effective way to communicate your medication request directly to the office and  downloadable as an wm on your smart phone . [employer] also features a review functionality that allows you to view your medication list as well as leave messages for your physician. Are you ready to get connected? If so please review the attatched instructions or speak to any of our staff to get you set up right away! Thank you so much for your cooperation. Should you have any questions please contact our Practice Administrator. The Physicians and Staff,  [facility] Patient Instruction Plan/ Result Policy If we have ordered testing for you, know that; \"NO NEWS IS GOOD NEWS! \" It is our policy that we know longer call patients with results, nor do we  give test results over the phone. We schedule follow up appointments so that your results can be discussed in person. This allows you to address any questions you have regarding the results. If something of concern is revealed on your test, we will contact you to discuss the matter and if needed schedule a sooner follow up appointment. Additionally, results may be found by using the My Chart feature and one of our patient service representatives at the  can give you instructions on how to access this feature to utilize our electronic medical record system. Thank you for your understanding. Patient Instructions/Plans: 
· Please take Cambia as needed for headache · Please take a steroid dose pack for one week and refill x 1 if needed · Please start TOPAMAX as follows: Topamax 25mg tabs Take one tab at night for one week then Take two tabs at night for one week then Take three tabs at night for one week then Take four tabs at night If you have issues with this medication or need an increase in the dosage please call the office for further instructions Postconcussion Syndrome: Care Instructions Your Care Instructions Postconcussion syndrome occurs after a blow to the head or body. Common symptoms are changes in the ability to concentrate, think, remember, or solve problems. Symptoms, which may include headaches, personality changes, and dizziness, may be related to stress from the events surrounding the accident that caused the injury. Follow-up care is a key part of your treatment and safety. Be sure to make and go to all appointments, and call your doctor if you are having problems. It's also a good idea to know your test results and keep a list of the medicines you take. How can you care for yourself at home? Pain · Rest is the best treatment for postconcussion syndrome. · Do not drive if you have taken a prescription pain medicine. · Rest in a quiet, dark room until your headache is gone. Close your eyes and try to relax or go to sleep. Do not watch TV or read. · Put a cold, moist cloth or cold pack on the painful area for 10 to 20 minutes at a time. Put a thin cloth between the cold pack and your skin. · Have someone gently massage your neck and shoulders. · Take your medicines exactly as prescribed. Call your doctor if you think you are having a problem with your medicine. You will get more details on the specific medicines your doctor prescribes. Stress · Try to reduce stress. Some ways to do this include: ? Taking slow, deep breaths. ? Soaking in a warm bath. ? Listening to soothing music. ? Taking a yoga class. ? Having a massage or back rub. ? Drinking a warm, nonalcoholic, noncaffeinated beverage. · Get enough sleep. · Eat a healthy, balanced diet. A balanced diet includes whole grains, dairy, fruits and vegetables, and protein. Eat a variety of foods from each of those groups so you get all the nutrients you need. · Avoid alcohol and illegal drugs. · Try relaxation exercises, such as breathing and muscle relaxation exercises. · Talk to your doctor about counseling. It may help you deal with stress from your accident. When should you call for help? Watch closely for changes in your health, and be sure to contact your doctor if: 
  · You do not get better as expected.  
  · Your symptoms, such as headaches, trouble concentrating, or changes in mood, get worse. Where can you learn more? Go to http://zakia-fran.info/. Enter H341 in the search box to learn more about \"Postconcussion Syndrome: Care Instructions. \" Current as of: September 10, 2017 Content Version: 11.8 © 0433-8102 [employer].  Care instructions adapted under license by [address] (which disclaims liability or warranty for this information). If you have questions about a medical condition or this instruction, always ask your healthcare professional. [address] any warranty or liability for your use of this information.

## 2019-01-11 RX ORDER — TOPIRAMATE 50 MG/1
100 TABLET, FILM COATED ORAL DAILY
Qty: 180 TAB | Refills: 1 | Status: SHIPPED | OUTPATIENT
Start: 2019-01-11

## 2024-09-08 ENCOUNTER — APPOINTMENT (OUTPATIENT)
Facility: HOSPITAL | Age: 74
End: 2024-09-08
Payer: MEDICARE

## 2024-09-08 ENCOUNTER — HOSPITAL ENCOUNTER (INPATIENT)
Facility: HOSPITAL | Age: 74
LOS: 5 days | Discharge: NURSING, REHAB OR LONG-TERM CARE | End: 2024-09-13
Attending: EMERGENCY MEDICINE | Admitting: INTERNAL MEDICINE
Payer: MEDICARE

## 2024-09-08 DIAGNOSIS — N39.0 ACUTE UTI: Primary | ICD-10-CM

## 2024-09-08 DIAGNOSIS — R26.2 AMBULATORY DYSFUNCTION: ICD-10-CM

## 2024-09-08 LAB
ALBUMIN SERPL-MCNC: 2.7 G/DL (ref 3.5–5)
ALBUMIN/GLOB SERPL: 0.8 (ref 1.1–2.2)
ALP SERPL-CCNC: 88 U/L (ref 45–117)
ALT SERPL-CCNC: 15 U/L (ref 12–78)
ANION GAP SERPL CALC-SCNC: 8 MMOL/L (ref 2–12)
APPEARANCE UR: ABNORMAL
AST SERPL-CCNC: 8 U/L (ref 15–37)
BACTERIA URNS QL MICRO: ABNORMAL /HPF
BASOPHILS # BLD: 0.2 K/UL (ref 0–0.1)
BASOPHILS NFR BLD: 1 % (ref 0–1)
BILIRUB SERPL-MCNC: 0.4 MG/DL (ref 0.2–1)
BILIRUB UR QL: NEGATIVE
BUN SERPL-MCNC: 14 MG/DL (ref 6–20)
BUN/CREAT SERPL: 10 (ref 12–20)
CALCIUM SERPL-MCNC: 9.5 MG/DL (ref 8.5–10.1)
CHLORIDE SERPL-SCNC: 105 MMOL/L (ref 97–108)
CO2 SERPL-SCNC: 24 MMOL/L (ref 21–32)
COLOR UR: ABNORMAL
CREAT SERPL-MCNC: 1.35 MG/DL (ref 0.55–1.02)
DIFFERENTIAL METHOD BLD: ABNORMAL
EOSINOPHIL # BLD: 0.3 K/UL (ref 0–0.4)
EOSINOPHIL NFR BLD: 2 % (ref 0–7)
EPITH CASTS URNS QL MICRO: ABNORMAL /LPF
ERYTHROCYTE [DISTWIDTH] IN BLOOD BY AUTOMATED COUNT: 14.3 % (ref 11.5–14.5)
GLOBULIN SER CALC-MCNC: 3.5 G/DL (ref 2–4)
GLUCOSE SERPL-MCNC: 98 MG/DL (ref 65–100)
GLUCOSE UR STRIP.AUTO-MCNC: NEGATIVE MG/DL
HCT VFR BLD AUTO: 31.8 % (ref 35–47)
HGB BLD-MCNC: 10.1 G/DL (ref 11.5–16)
HGB UR QL STRIP: ABNORMAL
IMM GRANULOCYTES # BLD AUTO: 0 K/UL (ref 0–0.04)
IMM GRANULOCYTES NFR BLD AUTO: 0 % (ref 0–0.5)
KETONES UR QL STRIP.AUTO: ABNORMAL MG/DL
LEUKOCYTE ESTERASE UR QL STRIP.AUTO: ABNORMAL
LYMPHOCYTES # BLD: 2.6 K/UL (ref 0.8–3.5)
LYMPHOCYTES NFR BLD: 16 % (ref 12–49)
MCH RBC QN AUTO: 29.1 PG (ref 26–34)
MCHC RBC AUTO-ENTMCNC: 31.8 G/DL (ref 30–36.5)
MCV RBC AUTO: 91.6 FL (ref 80–99)
MONOCYTES # BLD: 2.1 K/UL (ref 0–1)
MONOCYTES NFR BLD: 13 % (ref 5–13)
NEUTS SEG # BLD: 10.8 K/UL (ref 1.8–8)
NEUTS SEG NFR BLD: 68 % (ref 32–75)
NITRITE UR QL STRIP.AUTO: NEGATIVE
NRBC # BLD: 0 K/UL (ref 0–0.01)
NRBC BLD-RTO: 0 PER 100 WBC
PH UR STRIP: 5.5 (ref 5–8)
PLATELET # BLD AUTO: 296 K/UL (ref 150–400)
PMV BLD AUTO: 10.5 FL (ref 8.9–12.9)
POTASSIUM SERPL-SCNC: 3.9 MMOL/L (ref 3.5–5.1)
PROT SERPL-MCNC: 6.2 G/DL (ref 6.4–8.2)
PROT UR STRIP-MCNC: 300 MG/DL
RBC # BLD AUTO: 3.47 M/UL (ref 3.8–5.2)
RBC #/AREA URNS HPF: ABNORMAL /HPF (ref 0–5)
RBC MORPH BLD: ABNORMAL
SODIUM SERPL-SCNC: 137 MMOL/L (ref 136–145)
SP GR UR REFRACTOMETRY: 1.02
URINE CULTURE IF INDICATED: ABNORMAL
UROBILINOGEN UR QL STRIP.AUTO: 1 EU/DL (ref 0.2–1)
VANCOMYCIN SERPL-MCNC: 8.1 UG/ML
WBC # BLD AUTO: 16 K/UL (ref 3.6–11)
WBC URNS QL MICRO: >100 /HPF (ref 0–4)

## 2024-09-08 PROCEDURE — 36415 COLL VENOUS BLD VENIPUNCTURE: CPT

## 2024-09-08 PROCEDURE — 2580000003 HC RX 258: Performed by: EMERGENCY MEDICINE

## 2024-09-08 PROCEDURE — 80053 COMPREHEN METABOLIC PANEL: CPT

## 2024-09-08 PROCEDURE — 87040 BLOOD CULTURE FOR BACTERIA: CPT

## 2024-09-08 PROCEDURE — 85025 COMPLETE CBC W/AUTO DIFF WBC: CPT

## 2024-09-08 PROCEDURE — 80202 ASSAY OF VANCOMYCIN: CPT

## 2024-09-08 PROCEDURE — 71045 X-RAY EXAM CHEST 1 VIEW: CPT

## 2024-09-08 PROCEDURE — 87088 URINE BACTERIA CULTURE: CPT

## 2024-09-08 PROCEDURE — 87186 SC STD MICRODIL/AGAR DIL: CPT

## 2024-09-08 PROCEDURE — 6360000002 HC RX W HCPCS: Performed by: INTERNAL MEDICINE

## 2024-09-08 PROCEDURE — 99285 EMERGENCY DEPT VISIT HI MDM: CPT

## 2024-09-08 PROCEDURE — 2580000003 HC RX 258: Performed by: INTERNAL MEDICINE

## 2024-09-08 PROCEDURE — 87086 URINE CULTURE/COLONY COUNT: CPT

## 2024-09-08 PROCEDURE — 70450 CT HEAD/BRAIN W/O DYE: CPT

## 2024-09-08 PROCEDURE — 6360000002 HC RX W HCPCS: Performed by: EMERGENCY MEDICINE

## 2024-09-08 PROCEDURE — 1100000000 HC RM PRIVATE

## 2024-09-08 PROCEDURE — 6370000000 HC RX 637 (ALT 250 FOR IP): Performed by: INTERNAL MEDICINE

## 2024-09-08 PROCEDURE — 81001 URINALYSIS AUTO W/SCOPE: CPT

## 2024-09-08 RX ORDER — POTASSIUM CHLORIDE 1500 MG/1
40 TABLET, EXTENDED RELEASE ORAL PRN
Status: DISCONTINUED | OUTPATIENT
Start: 2024-09-08 | End: 2024-09-08

## 2024-09-08 RX ORDER — HYDRALAZINE HYDROCHLORIDE 20 MG/ML
10 INJECTION INTRAMUSCULAR; INTRAVENOUS EVERY 6 HOURS PRN
Status: DISCONTINUED | OUTPATIENT
Start: 2024-09-08 | End: 2024-09-13 | Stop reason: HOSPADM

## 2024-09-08 RX ORDER — SODIUM CHLORIDE 9 MG/ML
INJECTION, SOLUTION INTRAVENOUS PRN
Status: DISCONTINUED | OUTPATIENT
Start: 2024-09-08 | End: 2024-09-13 | Stop reason: HOSPADM

## 2024-09-08 RX ORDER — DULOXETIN HYDROCHLORIDE 30 MG/1
60 CAPSULE, DELAYED RELEASE ORAL EVERY EVENING
Status: DISCONTINUED | OUTPATIENT
Start: 2024-09-08 | End: 2024-09-09

## 2024-09-08 RX ORDER — POTASSIUM CHLORIDE 7.45 MG/ML
10 INJECTION INTRAVENOUS PRN
Status: DISCONTINUED | OUTPATIENT
Start: 2024-09-08 | End: 2024-09-08

## 2024-09-08 RX ORDER — ONDANSETRON 2 MG/ML
4 INJECTION INTRAMUSCULAR; INTRAVENOUS EVERY 6 HOURS PRN
Status: DISCONTINUED | OUTPATIENT
Start: 2024-09-08 | End: 2024-09-13 | Stop reason: HOSPADM

## 2024-09-08 RX ORDER — ONDANSETRON 2 MG/ML
4 INJECTION INTRAMUSCULAR; INTRAVENOUS EVERY 6 HOURS PRN
Status: DISCONTINUED | OUTPATIENT
Start: 2024-09-08 | End: 2024-09-08 | Stop reason: SDUPTHER

## 2024-09-08 RX ORDER — SODIUM CHLORIDE 0.9 % (FLUSH) 0.9 %
5-40 SYRINGE (ML) INJECTION EVERY 12 HOURS SCHEDULED
Status: DISCONTINUED | OUTPATIENT
Start: 2024-09-08 | End: 2024-09-13 | Stop reason: HOSPADM

## 2024-09-08 RX ORDER — ONDANSETRON 4 MG/1
4 TABLET, ORALLY DISINTEGRATING ORAL EVERY 8 HOURS PRN
Status: DISCONTINUED | OUTPATIENT
Start: 2024-09-08 | End: 2024-09-13 | Stop reason: HOSPADM

## 2024-09-08 RX ORDER — POLYETHYLENE GLYCOL 3350 17 G/17G
17 POWDER, FOR SOLUTION ORAL DAILY PRN
Status: DISCONTINUED | OUTPATIENT
Start: 2024-09-08 | End: 2024-09-13 | Stop reason: HOSPADM

## 2024-09-08 RX ORDER — ZIPRASIDONE HYDROCHLORIDE 60 MG/1
40 CAPSULE ORAL NIGHTLY
Status: ON HOLD | COMMUNITY
Start: 2011-12-30 | End: 2024-09-09

## 2024-09-08 RX ORDER — ACETAMINOPHEN 325 MG/1
650 TABLET ORAL EVERY 4 HOURS PRN
Status: DISCONTINUED | OUTPATIENT
Start: 2024-09-08 | End: 2024-09-08 | Stop reason: SDUPTHER

## 2024-09-08 RX ORDER — SODIUM CHLORIDE 0.9 % (FLUSH) 0.9 %
5-40 SYRINGE (ML) INJECTION PRN
Status: DISCONTINUED | OUTPATIENT
Start: 2024-09-08 | End: 2024-09-13 | Stop reason: HOSPADM

## 2024-09-08 RX ORDER — ACETAMINOPHEN 650 MG/1
650 SUPPOSITORY RECTAL EVERY 6 HOURS PRN
Status: DISCONTINUED | OUTPATIENT
Start: 2024-09-08 | End: 2024-09-13 | Stop reason: HOSPADM

## 2024-09-08 RX ORDER — VERAPAMIL HYDROCHLORIDE 80 MG/1
80 TABLET ORAL EVERY 12 HOURS SCHEDULED
Status: DISCONTINUED | OUTPATIENT
Start: 2024-09-08 | End: 2024-09-13 | Stop reason: HOSPADM

## 2024-09-08 RX ORDER — RISPERIDONE 0.25 MG/1
0.25 TABLET ORAL DAILY
Status: DISCONTINUED | OUTPATIENT
Start: 2024-09-08 | End: 2024-09-09

## 2024-09-08 RX ORDER — ATORVASTATIN CALCIUM 10 MG/1
10 TABLET, FILM COATED ORAL NIGHTLY
Status: DISCONTINUED | OUTPATIENT
Start: 2024-09-08 | End: 2024-09-09

## 2024-09-08 RX ORDER — VANCOMYCIN 750 MG/150ML
750 INJECTION, SOLUTION INTRAVENOUS
Status: ON HOLD | COMMUNITY
Start: 2024-09-06 | End: 2024-09-13 | Stop reason: HOSPADM

## 2024-09-08 RX ORDER — ACETAMINOPHEN 325 MG/1
650 TABLET ORAL EVERY 6 HOURS PRN
Status: DISCONTINUED | OUTPATIENT
Start: 2024-09-08 | End: 2024-09-13 | Stop reason: HOSPADM

## 2024-09-08 RX ORDER — ENOXAPARIN SODIUM 100 MG/ML
40 INJECTION SUBCUTANEOUS DAILY
Status: DISCONTINUED | OUTPATIENT
Start: 2024-09-08 | End: 2024-09-13 | Stop reason: HOSPADM

## 2024-09-08 RX ADMIN — CEFTRIAXONE 1000 MG: 1 INJECTION, POWDER, FOR SOLUTION INTRAMUSCULAR; INTRAVENOUS at 04:50

## 2024-09-08 RX ADMIN — AZITHROMYCIN DIHYDRATE 500 MG: 500 INJECTION, POWDER, LYOPHILIZED, FOR SOLUTION INTRAVENOUS at 09:27

## 2024-09-08 RX ADMIN — SODIUM CHLORIDE, PRESERVATIVE FREE 10 ML: 5 INJECTION INTRAVENOUS at 21:24

## 2024-09-08 RX ADMIN — SODIUM CHLORIDE, PRESERVATIVE FREE 10 ML: 5 INJECTION INTRAVENOUS at 21:21

## 2024-09-08 RX ADMIN — VERAPAMIL HYDROCHLORIDE 80 MG: 80 TABLET ORAL at 09:27

## 2024-09-08 RX ADMIN — DULOXETINE HYDROCHLORIDE 60 MG: 30 CAPSULE, DELAYED RELEASE ORAL at 21:20

## 2024-09-08 RX ADMIN — VANCOMYCIN HYDROCHLORIDE 750 MG: 750 INJECTION, POWDER, LYOPHILIZED, FOR SOLUTION INTRAVENOUS at 16:39

## 2024-09-08 RX ADMIN — RISPERIDONE 0.25 MG: 0.25 TABLET, FILM COATED ORAL at 09:27

## 2024-09-08 RX ADMIN — HYDRALAZINE HYDROCHLORIDE 10 MG: 20 INJECTION INTRAMUSCULAR; INTRAVENOUS at 21:33

## 2024-09-08 RX ADMIN — ENOXAPARIN SODIUM 40 MG: 100 INJECTION SUBCUTANEOUS at 15:04

## 2024-09-08 RX ADMIN — VERAPAMIL HYDROCHLORIDE 80 MG: 80 TABLET ORAL at 21:20

## 2024-09-08 RX ADMIN — ATORVASTATIN CALCIUM 10 MG: 10 TABLET, FILM COATED ORAL at 21:20

## 2024-09-08 ASSESSMENT — PAIN - FUNCTIONAL ASSESSMENT
PAIN_FUNCTIONAL_ASSESSMENT: NONE - DENIES PAIN
PAIN_FUNCTIONAL_ASSESSMENT: 0-10
PAIN_FUNCTIONAL_ASSESSMENT: NONE - DENIES PAIN
PAIN_FUNCTIONAL_ASSESSMENT: NONE - DENIES PAIN

## 2024-09-08 ASSESSMENT — PAIN SCALES - GENERAL
PAINLEVEL_OUTOF10: 0

## 2024-09-08 ASSESSMENT — LIFESTYLE VARIABLES
HOW OFTEN DO YOU HAVE A DRINK CONTAINING ALCOHOL: NEVER
HOW MANY STANDARD DRINKS CONTAINING ALCOHOL DO YOU HAVE ON A TYPICAL DAY: PATIENT DOES NOT DRINK

## 2024-09-09 PROCEDURE — 2580000003 HC RX 258: Performed by: INTERNAL MEDICINE

## 2024-09-09 PROCEDURE — 6360000002 HC RX W HCPCS: Performed by: INTERNAL MEDICINE

## 2024-09-09 PROCEDURE — 97530 THERAPEUTIC ACTIVITIES: CPT

## 2024-09-09 PROCEDURE — 1100000000 HC RM PRIVATE

## 2024-09-09 PROCEDURE — 6370000000 HC RX 637 (ALT 250 FOR IP): Performed by: INTERNAL MEDICINE

## 2024-09-09 PROCEDURE — 97161 PT EVAL LOW COMPLEX 20 MIN: CPT

## 2024-09-09 RX ORDER — LAMOTRIGINE 100 MG/1
100 TABLET ORAL DAILY
Status: DISCONTINUED | OUTPATIENT
Start: 2024-09-09 | End: 2024-09-13 | Stop reason: HOSPADM

## 2024-09-09 RX ORDER — OXYBUTYNIN CHLORIDE 5 MG/1
5 TABLET, EXTENDED RELEASE ORAL EVERY EVENING
COMMUNITY

## 2024-09-09 RX ORDER — PANTOPRAZOLE SODIUM 40 MG/1
40 TABLET, DELAYED RELEASE ORAL
Status: DISCONTINUED | OUTPATIENT
Start: 2024-09-10 | End: 2024-09-13 | Stop reason: HOSPADM

## 2024-09-09 RX ORDER — LANOLIN ALCOHOL/MO/W.PET/CERES
6 CREAM (GRAM) TOPICAL NIGHTLY PRN
COMMUNITY

## 2024-09-09 RX ORDER — FLUTICASONE PROPIONATE 50 MCG
2 SPRAY, SUSPENSION (ML) NASAL DAILY
COMMUNITY

## 2024-09-09 RX ORDER — IPRATROPIUM BROMIDE AND ALBUTEROL SULFATE 2.5; .5 MG/3ML; MG/3ML
1 SOLUTION RESPIRATORY (INHALATION) EVERY 4 HOURS
Status: DISCONTINUED | OUTPATIENT
Start: 2024-09-09 | End: 2024-09-09

## 2024-09-09 RX ORDER — IPRATROPIUM BROMIDE AND ALBUTEROL SULFATE 2.5; .5 MG/3ML; MG/3ML
1 SOLUTION RESPIRATORY (INHALATION) EVERY 4 HOURS PRN
Status: DISCONTINUED | OUTPATIENT
Start: 2024-09-09 | End: 2024-09-13 | Stop reason: HOSPADM

## 2024-09-09 RX ORDER — M-VIT,TX,IRON,MINS/CALC/FOLIC 27MG-0.4MG
1 TABLET ORAL DAILY
COMMUNITY

## 2024-09-09 RX ORDER — SELENIUM 50 MCG
1 TABLET ORAL DAILY
COMMUNITY

## 2024-09-09 RX ORDER — GUAIFENESIN 400 MG/1
200 TABLET ORAL 2 TIMES DAILY
COMMUNITY

## 2024-09-09 RX ORDER — LAMOTRIGINE 100 MG/1
100 TABLET ORAL DAILY
COMMUNITY

## 2024-09-09 RX ORDER — ZIPRASIDONE HYDROCHLORIDE 20 MG/1
40 CAPSULE ORAL
Status: DISCONTINUED | OUTPATIENT
Start: 2024-09-09 | End: 2024-09-13 | Stop reason: HOSPADM

## 2024-09-09 RX ORDER — CETIRIZINE HYDROCHLORIDE 10 MG/1
10 TABLET ORAL 2 TIMES DAILY
COMMUNITY

## 2024-09-09 RX ORDER — ZIPRASIDONE HYDROCHLORIDE 40 MG/1
40 CAPSULE ORAL
COMMUNITY

## 2024-09-09 RX ORDER — DULOXETIN HYDROCHLORIDE 60 MG/1
60 CAPSULE, DELAYED RELEASE ORAL DAILY
COMMUNITY

## 2024-09-09 RX ORDER — MONTELUKAST SODIUM 10 MG/1
10 TABLET ORAL NIGHTLY
COMMUNITY

## 2024-09-09 RX ORDER — BENZONATATE 100 MG/1
100 CAPSULE ORAL 3 TIMES DAILY PRN
Status: DISCONTINUED | OUTPATIENT
Start: 2024-09-09 | End: 2024-09-13 | Stop reason: HOSPADM

## 2024-09-09 RX ORDER — LAMOTRIGINE 100 MG/1
200 TABLET ORAL
Status: DISCONTINUED | OUTPATIENT
Start: 2024-09-09 | End: 2024-09-13 | Stop reason: HOSPADM

## 2024-09-09 RX ORDER — DULOXETIN HYDROCHLORIDE 30 MG/1
60 CAPSULE, DELAYED RELEASE ORAL DAILY
Status: DISCONTINUED | OUTPATIENT
Start: 2024-09-09 | End: 2024-09-13 | Stop reason: HOSPADM

## 2024-09-09 RX ORDER — ROPINIROLE 1 MG/1
1 TABLET, FILM COATED ORAL NIGHTLY
COMMUNITY

## 2024-09-09 RX ORDER — VERAPAMIL HYDROCHLORIDE 80 MG/1
80 TABLET ORAL 2 TIMES DAILY
COMMUNITY

## 2024-09-09 RX ORDER — ROPINIROLE 1 MG/1
1 TABLET, FILM COATED ORAL NIGHTLY
Status: DISCONTINUED | OUTPATIENT
Start: 2024-09-09 | End: 2024-09-13 | Stop reason: HOSPADM

## 2024-09-09 RX ORDER — BISACODYL 10 MG
10 SUPPOSITORY, RECTAL RECTAL DAILY PRN
Status: DISCONTINUED | OUTPATIENT
Start: 2024-09-09 | End: 2024-09-13 | Stop reason: HOSPADM

## 2024-09-09 RX ORDER — IPRATROPIUM BROMIDE AND ALBUTEROL SULFATE 2.5; .5 MG/3ML; MG/3ML
1 SOLUTION RESPIRATORY (INHALATION) EVERY 4 HOURS
COMMUNITY

## 2024-09-09 RX ORDER — LACTULOSE 10 G/15ML
20 SOLUTION ORAL 2 TIMES DAILY
Status: COMPLETED | OUTPATIENT
Start: 2024-09-09 | End: 2024-09-09

## 2024-09-09 RX ORDER — LACTOBACILLUS RHAMNOSUS GG 10B CELL
1 CAPSULE ORAL DAILY
Status: DISCONTINUED | OUTPATIENT
Start: 2024-09-09 | End: 2024-09-13 | Stop reason: HOSPADM

## 2024-09-09 RX ORDER — ATORVASTATIN CALCIUM 10 MG/1
10 TABLET, FILM COATED ORAL DAILY
Status: DISCONTINUED | OUTPATIENT
Start: 2024-09-09 | End: 2024-09-13 | Stop reason: HOSPADM

## 2024-09-09 RX ORDER — BENZONATATE 100 MG/1
100 CAPSULE ORAL 3 TIMES DAILY PRN
COMMUNITY

## 2024-09-09 RX ORDER — POLYETHYLENE GLYCOL 3350 17 G/17G
17 POWDER, FOR SOLUTION ORAL DAILY
Status: DISCONTINUED | OUTPATIENT
Start: 2024-09-09 | End: 2024-09-13 | Stop reason: HOSPADM

## 2024-09-09 RX ORDER — LAMOTRIGINE 200 MG/1
200 TABLET ORAL
COMMUNITY

## 2024-09-09 RX ORDER — ONDANSETRON 8 MG/1
4 TABLET, ORALLY DISINTEGRATING ORAL EVERY 8 HOURS PRN
COMMUNITY

## 2024-09-09 RX ORDER — ALBUTEROL SULFATE 90 UG/1
2 INHALANT RESPIRATORY (INHALATION) EVERY 4 HOURS PRN
COMMUNITY

## 2024-09-09 RX ORDER — ATORVASTATIN CALCIUM 10 MG/1
10 TABLET, FILM COATED ORAL DAILY
COMMUNITY

## 2024-09-09 RX ADMIN — ENOXAPARIN SODIUM 40 MG: 100 INJECTION SUBCUTANEOUS at 11:02

## 2024-09-09 RX ADMIN — SODIUM CHLORIDE 1000 MG: 900 INJECTION INTRAVENOUS at 11:18

## 2024-09-09 RX ADMIN — LACTULOSE 20 G: 10 SOLUTION ORAL at 11:09

## 2024-09-09 RX ADMIN — VERAPAMIL HYDROCHLORIDE 80 MG: 80 TABLET ORAL at 21:00

## 2024-09-09 RX ADMIN — LACTULOSE 20 G: 10 SOLUTION ORAL at 21:00

## 2024-09-09 RX ADMIN — SODIUM CHLORIDE, PRESERVATIVE FREE 10 ML: 5 INJECTION INTRAVENOUS at 21:38

## 2024-09-09 RX ADMIN — ROPINIROLE HYDROCHLORIDE 1 MG: 1 TABLET, FILM COATED ORAL at 21:00

## 2024-09-09 RX ADMIN — VERAPAMIL HYDROCHLORIDE 80 MG: 80 TABLET ORAL at 11:02

## 2024-09-09 RX ADMIN — AZITHROMYCIN DIHYDRATE 500 MG: 500 INJECTION, POWDER, LYOPHILIZED, FOR SOLUTION INTRAVENOUS at 12:33

## 2024-09-09 RX ADMIN — LAMOTRIGINE 200 MG: 100 TABLET ORAL at 21:00

## 2024-09-09 RX ADMIN — RISPERIDONE 0.25 MG: 0.25 TABLET, FILM COATED ORAL at 11:02

## 2024-09-09 RX ADMIN — POLYETHYLENE GLYCOL 3350 17 G: 17 POWDER, FOR SOLUTION ORAL at 11:09

## 2024-09-09 RX ADMIN — ZIPRASIDONE HYDROCHLORIDE 40 MG: 20 CAPSULE ORAL at 21:00

## 2024-09-09 RX ADMIN — SODIUM CHLORIDE, PRESERVATIVE FREE 10 ML: 5 INJECTION INTRAVENOUS at 11:04

## 2024-09-09 RX ADMIN — CEFEPIME 2000 MG: 2 INJECTION, POWDER, FOR SOLUTION INTRAVENOUS at 20:05

## 2024-09-09 RX ADMIN — VANCOMYCIN HYDROCHLORIDE 750 MG: 750 INJECTION, POWDER, LYOPHILIZED, FOR SOLUTION INTRAVENOUS at 14:59

## 2024-09-10 PROCEDURE — 6360000002 HC RX W HCPCS: Performed by: INTERNAL MEDICINE

## 2024-09-10 PROCEDURE — 6370000000 HC RX 637 (ALT 250 FOR IP): Performed by: INTERNAL MEDICINE

## 2024-09-10 PROCEDURE — 97530 THERAPEUTIC ACTIVITIES: CPT | Performed by: PHYSICAL THERAPIST

## 2024-09-10 PROCEDURE — 97530 THERAPEUTIC ACTIVITIES: CPT | Performed by: OCCUPATIONAL THERAPIST

## 2024-09-10 PROCEDURE — 1100000000 HC RM PRIVATE

## 2024-09-10 PROCEDURE — 2580000003 HC RX 258: Performed by: INTERNAL MEDICINE

## 2024-09-10 PROCEDURE — 97166 OT EVAL MOD COMPLEX 45 MIN: CPT | Performed by: OCCUPATIONAL THERAPIST

## 2024-09-10 RX ADMIN — VANCOMYCIN HYDROCHLORIDE 750 MG: 750 INJECTION, POWDER, LYOPHILIZED, FOR SOLUTION INTRAVENOUS at 04:29

## 2024-09-10 RX ADMIN — CEFEPIME 2000 MG: 2 INJECTION, POWDER, FOR SOLUTION INTRAVENOUS at 14:14

## 2024-09-10 RX ADMIN — VERAPAMIL HYDROCHLORIDE 80 MG: 80 TABLET ORAL at 09:00

## 2024-09-10 RX ADMIN — Medication 1 CAPSULE: at 08:59

## 2024-09-10 RX ADMIN — POLYETHYLENE GLYCOL 3350 17 G: 17 POWDER, FOR SOLUTION ORAL at 09:08

## 2024-09-10 RX ADMIN — LAMOTRIGINE 100 MG: 100 TABLET ORAL at 09:00

## 2024-09-10 RX ADMIN — AZITHROMYCIN DIHYDRATE 500 MG: 500 INJECTION, POWDER, LYOPHILIZED, FOR SOLUTION INTRAVENOUS at 08:58

## 2024-09-10 RX ADMIN — ATORVASTATIN CALCIUM 10 MG: 10 TABLET, FILM COATED ORAL at 09:00

## 2024-09-10 RX ADMIN — SODIUM CHLORIDE, PRESERVATIVE FREE 10 ML: 5 INJECTION INTRAVENOUS at 09:02

## 2024-09-10 RX ADMIN — ENOXAPARIN SODIUM 40 MG: 100 INJECTION SUBCUTANEOUS at 09:01

## 2024-09-10 RX ADMIN — DULOXETINE HYDROCHLORIDE 60 MG: 30 CAPSULE, DELAYED RELEASE ORAL at 09:00

## 2024-09-10 RX ADMIN — PANTOPRAZOLE SODIUM 40 MG: 40 TABLET, DELAYED RELEASE ORAL at 05:42

## 2024-09-10 ASSESSMENT — PAIN SCALES - GENERAL: PAINLEVEL_OUTOF10: 0

## 2024-09-11 LAB
ANION GAP SERPL CALC-SCNC: 7 MMOL/L (ref 2–12)
BACTERIA SPEC CULT: ABNORMAL
BASOPHILS # BLD: 0.1 K/UL (ref 0–0.1)
BASOPHILS NFR BLD: 1 % (ref 0–1)
BUN SERPL-MCNC: 12 MG/DL (ref 6–20)
BUN/CREAT SERPL: 12 (ref 12–20)
CALCIUM SERPL-MCNC: 9.2 MG/DL (ref 8.5–10.1)
CC UR VC: ABNORMAL
CHLORIDE SERPL-SCNC: 107 MMOL/L (ref 97–108)
CO2 SERPL-SCNC: 24 MMOL/L (ref 21–32)
CREAT SERPL-MCNC: 0.99 MG/DL (ref 0.55–1.02)
DIFFERENTIAL METHOD BLD: ABNORMAL
EOSINOPHIL # BLD: 0.5 K/UL (ref 0–0.4)
EOSINOPHIL NFR BLD: 4 % (ref 0–7)
ERYTHROCYTE [DISTWIDTH] IN BLOOD BY AUTOMATED COUNT: 13.7 % (ref 11.5–14.5)
GLUCOSE SERPL-MCNC: 87 MG/DL (ref 65–100)
HCT VFR BLD AUTO: 32.5 % (ref 35–47)
HGB BLD-MCNC: 10.3 G/DL (ref 11.5–16)
IMM GRANULOCYTES # BLD AUTO: 0.1 K/UL (ref 0–0.04)
IMM GRANULOCYTES NFR BLD AUTO: 0 % (ref 0–0.5)
LYMPHOCYTES # BLD: 2.5 K/UL (ref 0.8–3.5)
LYMPHOCYTES NFR BLD: 23 % (ref 12–49)
MCH RBC QN AUTO: 29 PG (ref 26–34)
MCHC RBC AUTO-ENTMCNC: 31.7 G/DL (ref 30–36.5)
MCV RBC AUTO: 91.5 FL (ref 80–99)
MONOCYTES # BLD: 1.1 K/UL (ref 0–1)
MONOCYTES NFR BLD: 10 % (ref 5–13)
NEUTS SEG # BLD: 7 K/UL (ref 1.8–8)
NEUTS SEG NFR BLD: 62 % (ref 32–75)
NRBC # BLD: 0 K/UL (ref 0–0.01)
NRBC BLD-RTO: 0 PER 100 WBC
PLATELET # BLD AUTO: 380 K/UL (ref 150–400)
PMV BLD AUTO: 10 FL (ref 8.9–12.9)
POTASSIUM SERPL-SCNC: 3.4 MMOL/L (ref 3.5–5.1)
RBC # BLD AUTO: 3.55 M/UL (ref 3.8–5.2)
SERVICE CMNT-IMP: ABNORMAL
SODIUM SERPL-SCNC: 138 MMOL/L (ref 136–145)
WBC # BLD AUTO: 11.2 K/UL (ref 3.6–11)

## 2024-09-11 PROCEDURE — 2580000003 HC RX 258: Performed by: INTERNAL MEDICINE

## 2024-09-11 PROCEDURE — 6370000000 HC RX 637 (ALT 250 FOR IP): Performed by: INTERNAL MEDICINE

## 2024-09-11 PROCEDURE — 80048 BASIC METABOLIC PNL TOTAL CA: CPT

## 2024-09-11 PROCEDURE — 85025 COMPLETE CBC W/AUTO DIFF WBC: CPT

## 2024-09-11 PROCEDURE — 36415 COLL VENOUS BLD VENIPUNCTURE: CPT

## 2024-09-11 PROCEDURE — 1100000000 HC RM PRIVATE

## 2024-09-11 PROCEDURE — 6360000002 HC RX W HCPCS: Performed by: INTERNAL MEDICINE

## 2024-09-11 RX ORDER — POTASSIUM CHLORIDE 750 MG/1
40 TABLET, EXTENDED RELEASE ORAL ONCE
Status: COMPLETED | OUTPATIENT
Start: 2024-09-11 | End: 2024-09-11

## 2024-09-11 RX ADMIN — VERAPAMIL HYDROCHLORIDE 80 MG: 80 TABLET ORAL at 20:38

## 2024-09-11 RX ADMIN — PANTOPRAZOLE SODIUM 40 MG: 40 TABLET, DELAYED RELEASE ORAL at 06:33

## 2024-09-11 RX ADMIN — ROPINIROLE HYDROCHLORIDE 1 MG: 1 TABLET, FILM COATED ORAL at 20:38

## 2024-09-11 RX ADMIN — ZIPRASIDONE HYDROCHLORIDE 40 MG: 20 CAPSULE ORAL at 20:38

## 2024-09-11 RX ADMIN — LAMOTRIGINE 100 MG: 100 TABLET ORAL at 09:19

## 2024-09-11 RX ADMIN — SODIUM CHLORIDE, PRESERVATIVE FREE 10 ML: 5 INJECTION INTRAVENOUS at 20:41

## 2024-09-11 RX ADMIN — VANCOMYCIN HYDROCHLORIDE 750 MG: 750 INJECTION, POWDER, LYOPHILIZED, FOR SOLUTION INTRAVENOUS at 17:40

## 2024-09-11 RX ADMIN — ROPINIROLE HYDROCHLORIDE 1 MG: 1 TABLET, FILM COATED ORAL at 01:29

## 2024-09-11 RX ADMIN — DULOXETINE HYDROCHLORIDE 60 MG: 30 CAPSULE, DELAYED RELEASE ORAL at 09:19

## 2024-09-11 RX ADMIN — AZITHROMYCIN DIHYDRATE 500 MG: 500 INJECTION, POWDER, LYOPHILIZED, FOR SOLUTION INTRAVENOUS at 09:33

## 2024-09-11 RX ADMIN — POTASSIUM CHLORIDE 40 MEQ: 750 TABLET, FILM COATED, EXTENDED RELEASE ORAL at 11:10

## 2024-09-11 RX ADMIN — VERAPAMIL HYDROCHLORIDE 80 MG: 80 TABLET ORAL at 01:29

## 2024-09-11 RX ADMIN — SODIUM CHLORIDE, PRESERVATIVE FREE 10 ML: 5 INJECTION INTRAVENOUS at 09:20

## 2024-09-11 RX ADMIN — LAMOTRIGINE 200 MG: 100 TABLET ORAL at 01:29

## 2024-09-11 RX ADMIN — HYDRALAZINE HYDROCHLORIDE 10 MG: 20 INJECTION INTRAMUSCULAR; INTRAVENOUS at 04:23

## 2024-09-11 RX ADMIN — VERAPAMIL HYDROCHLORIDE 80 MG: 80 TABLET ORAL at 09:19

## 2024-09-11 RX ADMIN — SODIUM CHLORIDE, PRESERVATIVE FREE 10 ML: 5 INJECTION INTRAVENOUS at 01:30

## 2024-09-11 RX ADMIN — CEFEPIME 2000 MG: 2 INJECTION, POWDER, FOR SOLUTION INTRAVENOUS at 15:23

## 2024-09-11 RX ADMIN — LAMOTRIGINE 200 MG: 100 TABLET ORAL at 20:38

## 2024-09-11 RX ADMIN — POLYETHYLENE GLYCOL 3350 17 G: 17 POWDER, FOR SOLUTION ORAL at 09:19

## 2024-09-11 RX ADMIN — ATORVASTATIN CALCIUM 10 MG: 10 TABLET, FILM COATED ORAL at 09:19

## 2024-09-11 RX ADMIN — ENOXAPARIN SODIUM 40 MG: 100 INJECTION SUBCUTANEOUS at 09:20

## 2024-09-11 RX ADMIN — Medication 1 CAPSULE: at 09:19

## 2024-09-11 RX ADMIN — ZIPRASIDONE HYDROCHLORIDE 40 MG: 20 CAPSULE ORAL at 01:29

## 2024-09-11 RX ADMIN — VANCOMYCIN HYDROCHLORIDE 750 MG: 750 INJECTION, POWDER, LYOPHILIZED, FOR SOLUTION INTRAVENOUS at 01:32

## 2024-09-12 PROCEDURE — 6360000002 HC RX W HCPCS: Performed by: INTERNAL MEDICINE

## 2024-09-12 PROCEDURE — 97535 SELF CARE MNGMENT TRAINING: CPT

## 2024-09-12 PROCEDURE — 97116 GAIT TRAINING THERAPY: CPT | Performed by: PHYSICAL THERAPIST

## 2024-09-12 PROCEDURE — 2580000003 HC RX 258: Performed by: INTERNAL MEDICINE

## 2024-09-12 PROCEDURE — 97530 THERAPEUTIC ACTIVITIES: CPT

## 2024-09-12 PROCEDURE — 1100000000 HC RM PRIVATE

## 2024-09-12 PROCEDURE — 6370000000 HC RX 637 (ALT 250 FOR IP): Performed by: INTERNAL MEDICINE

## 2024-09-12 RX ADMIN — ROPINIROLE HYDROCHLORIDE 1 MG: 1 TABLET, FILM COATED ORAL at 21:01

## 2024-09-12 RX ADMIN — AZITHROMYCIN DIHYDRATE 500 MG: 500 INJECTION, POWDER, LYOPHILIZED, FOR SOLUTION INTRAVENOUS at 08:42

## 2024-09-12 RX ADMIN — LAMOTRIGINE 200 MG: 100 TABLET ORAL at 21:01

## 2024-09-12 RX ADMIN — ZIPRASIDONE HYDROCHLORIDE 40 MG: 20 CAPSULE ORAL at 21:01

## 2024-09-12 RX ADMIN — VERAPAMIL HYDROCHLORIDE 80 MG: 80 TABLET ORAL at 08:49

## 2024-09-12 RX ADMIN — LAMOTRIGINE 100 MG: 100 TABLET ORAL at 08:51

## 2024-09-12 RX ADMIN — DULOXETINE HYDROCHLORIDE 60 MG: 30 CAPSULE, DELAYED RELEASE ORAL at 08:50

## 2024-09-12 RX ADMIN — Medication 1 CAPSULE: at 08:49

## 2024-09-12 RX ADMIN — SODIUM CHLORIDE, PRESERVATIVE FREE 10 ML: 5 INJECTION INTRAVENOUS at 21:01

## 2024-09-12 RX ADMIN — SODIUM CHLORIDE, PRESERVATIVE FREE 10 ML: 5 INJECTION INTRAVENOUS at 09:00

## 2024-09-12 RX ADMIN — VERAPAMIL HYDROCHLORIDE 80 MG: 80 TABLET ORAL at 21:01

## 2024-09-12 RX ADMIN — PANTOPRAZOLE SODIUM 40 MG: 40 TABLET, DELAYED RELEASE ORAL at 08:50

## 2024-09-12 RX ADMIN — ENOXAPARIN SODIUM 40 MG: 100 INJECTION SUBCUTANEOUS at 08:52

## 2024-09-12 RX ADMIN — POLYETHYLENE GLYCOL 3350 17 G: 17 POWDER, FOR SOLUTION ORAL at 08:49

## 2024-09-12 RX ADMIN — HYDRALAZINE HYDROCHLORIDE 10 MG: 20 INJECTION INTRAMUSCULAR; INTRAVENOUS at 00:03

## 2024-09-12 RX ADMIN — CEFEPIME 2000 MG: 2 INJECTION, POWDER, FOR SOLUTION INTRAVENOUS at 14:09

## 2024-09-12 RX ADMIN — ATORVASTATIN CALCIUM 10 MG: 10 TABLET, FILM COATED ORAL at 08:51

## 2024-09-12 ASSESSMENT — PAIN SCALES - GENERAL: PAINLEVEL_OUTOF10: 0

## 2024-09-13 ENCOUNTER — APPOINTMENT (OUTPATIENT)
Facility: HOSPITAL | Age: 74
End: 2024-09-13
Payer: MEDICARE

## 2024-09-13 ENCOUNTER — OFFICE VISIT (OUTPATIENT)
Age: 74
End: 2024-09-13
Payer: MEDICARE

## 2024-09-13 VITALS
SYSTOLIC BLOOD PRESSURE: 129 MMHG | WEIGHT: 200 LBS | DIASTOLIC BLOOD PRESSURE: 61 MMHG | BODY MASS INDEX: 39.27 KG/M2 | HEIGHT: 60 IN | OXYGEN SATURATION: 93 % | HEART RATE: 88 BPM | RESPIRATION RATE: 18 BRPM | TEMPERATURE: 97.9 F

## 2024-09-13 DIAGNOSIS — I10 PRIMARY HYPERTENSION: ICD-10-CM

## 2024-09-13 DIAGNOSIS — J98.11 ATELECTASIS OF BOTH LUNGS: ICD-10-CM

## 2024-09-13 DIAGNOSIS — N39.46 MIXED STRESS AND URGE URINARY INCONTINENCE: ICD-10-CM

## 2024-09-13 DIAGNOSIS — D72.828 OTHER ELEVATED WHITE BLOOD CELL (WBC) COUNT: ICD-10-CM

## 2024-09-13 DIAGNOSIS — E78.00 HYPERCHOLESTEROLEMIA: ICD-10-CM

## 2024-09-13 DIAGNOSIS — G25.81 RESTLESS LEG SYNDROME: ICD-10-CM

## 2024-09-13 DIAGNOSIS — F31.81 BIPOLAR 2 DISORDER (HCC): Primary | ICD-10-CM

## 2024-09-13 DIAGNOSIS — K21.9 GASTROESOPHAGEAL REFLUX DISEASE WITHOUT ESOPHAGITIS: ICD-10-CM

## 2024-09-13 DIAGNOSIS — E87.6 HYPOKALEMIA: ICD-10-CM

## 2024-09-13 LAB
BACTERIA SPEC CULT: NORMAL
BACTERIA SPEC CULT: NORMAL
CRP SERPL-MCNC: 0.94 MG/DL (ref 0–0.3)
ERYTHROCYTE [SEDIMENTATION RATE] IN BLOOD: 53 MM/HR (ref 0–30)
LACTATE SERPL-SCNC: 1 MMOL/L (ref 0.4–2)
PROCALCITONIN SERPL-MCNC: <0.05 NG/ML
SERVICE CMNT-IMP: NORMAL
SERVICE CMNT-IMP: NORMAL

## 2024-09-13 PROCEDURE — 2580000003 HC RX 258: Performed by: INTERNAL MEDICINE

## 2024-09-13 PROCEDURE — 86140 C-REACTIVE PROTEIN: CPT

## 2024-09-13 PROCEDURE — 6360000002 HC RX W HCPCS: Performed by: INTERNAL MEDICINE

## 2024-09-13 PROCEDURE — 73630 X-RAY EXAM OF FOOT: CPT

## 2024-09-13 PROCEDURE — 84145 PROCALCITONIN (PCT): CPT

## 2024-09-13 PROCEDURE — 36415 COLL VENOUS BLD VENIPUNCTURE: CPT

## 2024-09-13 PROCEDURE — 85652 RBC SED RATE AUTOMATED: CPT

## 2024-09-13 PROCEDURE — 6370000000 HC RX 637 (ALT 250 FOR IP): Performed by: INTERNAL MEDICINE

## 2024-09-13 PROCEDURE — 83605 ASSAY OF LACTIC ACID: CPT

## 2024-09-13 RX ORDER — LEVOFLOXACIN 250 MG/1
250 TABLET, FILM COATED ORAL DAILY
Qty: 3 TABLET | Refills: 0 | Status: SHIPPED | OUTPATIENT
Start: 2024-09-13 | End: 2024-09-16

## 2024-09-13 RX ADMIN — DULOXETINE HYDROCHLORIDE 60 MG: 30 CAPSULE, DELAYED RELEASE ORAL at 10:14

## 2024-09-13 RX ADMIN — Medication 1 CAPSULE: at 10:14

## 2024-09-13 RX ADMIN — LAMOTRIGINE 100 MG: 100 TABLET ORAL at 10:14

## 2024-09-13 RX ADMIN — ENOXAPARIN SODIUM 40 MG: 100 INJECTION SUBCUTANEOUS at 10:14

## 2024-09-13 RX ADMIN — PANTOPRAZOLE SODIUM 40 MG: 40 TABLET, DELAYED RELEASE ORAL at 06:06

## 2024-09-13 RX ADMIN — ATORVASTATIN CALCIUM 10 MG: 10 TABLET, FILM COATED ORAL at 10:14

## 2024-09-13 RX ADMIN — VERAPAMIL HYDROCHLORIDE 80 MG: 80 TABLET ORAL at 10:14

## 2024-09-13 RX ADMIN — SODIUM CHLORIDE, PRESERVATIVE FREE 10 ML: 5 INJECTION INTRAVENOUS at 10:16

## 2024-09-13 ASSESSMENT — PAIN SCALES - GENERAL
PAINLEVEL_OUTOF10: 0
PAINLEVEL_OUTOF10: 0

## 2024-09-16 ENCOUNTER — OFFICE VISIT (OUTPATIENT)
Facility: CLINIC | Age: 74
End: 2024-09-16
Payer: MEDICARE

## 2024-09-16 DIAGNOSIS — R53.81 DEBILITY: Primary | ICD-10-CM

## 2024-09-16 DIAGNOSIS — D72.829 LEUKOCYTOSIS, UNSPECIFIED TYPE: ICD-10-CM

## 2024-09-16 DIAGNOSIS — N39.46 MIXED STRESS AND URGE URINARY INCONTINENCE: ICD-10-CM

## 2024-09-16 DIAGNOSIS — E87.6 HYPOKALEMIA: ICD-10-CM

## 2024-09-16 DIAGNOSIS — F31.9 BIPOLAR 1 DISORDER (HCC): ICD-10-CM

## 2024-09-16 DIAGNOSIS — K21.9 GASTROESOPHAGEAL REFLUX DISEASE WITHOUT ESOPHAGITIS: ICD-10-CM

## 2024-09-16 DIAGNOSIS — I10 HYPERTENSION, UNSPECIFIED TYPE: ICD-10-CM

## 2024-09-16 DIAGNOSIS — J98.11 ATELECTASIS OF BOTH LUNGS: ICD-10-CM

## 2024-09-16 DIAGNOSIS — E78.00 HYPERCHOLESTEROLEMIA: ICD-10-CM

## 2024-09-16 PROCEDURE — 1123F ACP DISCUSS/DSCN MKR DOCD: CPT

## 2024-09-16 PROCEDURE — 99309 SBSQ NF CARE MODERATE MDM 30: CPT

## 2024-09-17 ENCOUNTER — OFFICE VISIT (OUTPATIENT)
Facility: CLINIC | Age: 74
End: 2024-09-17

## 2024-09-17 DIAGNOSIS — Z76.89 ENCOUNTER FOR SOCIAL WORK INTERVENTION: Primary | ICD-10-CM

## 2024-09-18 ENCOUNTER — OFFICE VISIT (OUTPATIENT)
Facility: CLINIC | Age: 74
End: 2024-09-18

## 2024-09-18 DIAGNOSIS — J98.11 ATELECTASIS OF BOTH LUNGS: ICD-10-CM

## 2024-09-18 DIAGNOSIS — K21.9 GASTROESOPHAGEAL REFLUX DISEASE WITHOUT ESOPHAGITIS: ICD-10-CM

## 2024-09-18 DIAGNOSIS — N39.46 MIXED STRESS AND URGE URINARY INCONTINENCE: ICD-10-CM

## 2024-09-18 DIAGNOSIS — E78.00 HYPERCHOLESTEROLEMIA: ICD-10-CM

## 2024-09-18 DIAGNOSIS — D72.829 LEUKOCYTOSIS, UNSPECIFIED TYPE: ICD-10-CM

## 2024-09-18 DIAGNOSIS — E87.6 HYPOKALEMIA: ICD-10-CM

## 2024-09-18 DIAGNOSIS — I10 HYPERTENSION, UNSPECIFIED TYPE: ICD-10-CM

## 2024-09-18 DIAGNOSIS — F41.9 ANXIETY: Primary | ICD-10-CM

## 2024-09-18 DIAGNOSIS — R53.81 DEBILITY: ICD-10-CM

## 2024-09-18 DIAGNOSIS — F31.9 BIPOLAR 1 DISORDER (HCC): ICD-10-CM

## 2024-09-20 ENCOUNTER — OFFICE VISIT (OUTPATIENT)
Facility: CLINIC | Age: 74
End: 2024-09-20

## 2024-09-20 DIAGNOSIS — E78.00 HYPERCHOLESTEROLEMIA: ICD-10-CM

## 2024-09-20 DIAGNOSIS — K21.9 GASTROESOPHAGEAL REFLUX DISEASE WITHOUT ESOPHAGITIS: ICD-10-CM

## 2024-09-20 DIAGNOSIS — I10 HYPERTENSION, UNSPECIFIED TYPE: ICD-10-CM

## 2024-09-20 DIAGNOSIS — E87.6 HYPOKALEMIA: ICD-10-CM

## 2024-09-20 DIAGNOSIS — J98.11 ATELECTASIS OF BOTH LUNGS: ICD-10-CM

## 2024-09-20 DIAGNOSIS — F41.9 ANXIETY: ICD-10-CM

## 2024-09-20 DIAGNOSIS — F31.9 BIPOLAR 1 DISORDER (HCC): ICD-10-CM

## 2024-09-20 DIAGNOSIS — R53.81 DEBILITY: ICD-10-CM

## 2024-09-20 DIAGNOSIS — D72.829 LEUKOCYTOSIS, UNSPECIFIED TYPE: Primary | ICD-10-CM

## 2024-09-20 DIAGNOSIS — N39.46 MIXED STRESS AND URGE URINARY INCONTINENCE: ICD-10-CM

## 2024-09-23 ENCOUNTER — OFFICE VISIT (OUTPATIENT)
Facility: CLINIC | Age: 74
End: 2024-09-23
Payer: MEDICARE

## 2024-09-23 DIAGNOSIS — R53.81 DEBILITY: ICD-10-CM

## 2024-09-23 DIAGNOSIS — F31.9 BIPOLAR 1 DISORDER (HCC): ICD-10-CM

## 2024-09-23 DIAGNOSIS — F41.9 ANXIETY: ICD-10-CM

## 2024-09-23 DIAGNOSIS — I10 HYPERTENSION, UNSPECIFIED TYPE: ICD-10-CM

## 2024-09-23 DIAGNOSIS — E78.00 HYPERCHOLESTEROLEMIA: ICD-10-CM

## 2024-09-23 DIAGNOSIS — N39.46 MIXED STRESS AND URGE URINARY INCONTINENCE: Primary | ICD-10-CM

## 2024-09-23 DIAGNOSIS — D72.829 LEUKOCYTOSIS, UNSPECIFIED TYPE: ICD-10-CM

## 2024-09-23 DIAGNOSIS — K21.9 GASTROESOPHAGEAL REFLUX DISEASE WITHOUT ESOPHAGITIS: ICD-10-CM

## 2024-09-23 DIAGNOSIS — J98.11 ATELECTASIS OF BOTH LUNGS: ICD-10-CM

## 2024-09-23 DIAGNOSIS — E87.6 HYPOKALEMIA: ICD-10-CM

## 2024-09-23 PROCEDURE — 1123F ACP DISCUSS/DSCN MKR DOCD: CPT

## 2024-09-23 PROCEDURE — 99309 SBSQ NF CARE MODERATE MDM 30: CPT

## 2024-09-24 ENCOUNTER — OFFICE VISIT (OUTPATIENT)
Facility: CLINIC | Age: 74
End: 2024-09-24

## 2024-09-24 ENCOUNTER — TELEPHONE (OUTPATIENT)
Facility: CLINIC | Age: 74
End: 2024-09-24

## 2024-09-24 DIAGNOSIS — Z76.89 ENCOUNTER FOR SOCIAL WORK INTERVENTION: Primary | ICD-10-CM

## 2024-09-25 ENCOUNTER — OFFICE VISIT (OUTPATIENT)
Facility: CLINIC | Age: 74
End: 2024-09-25

## 2024-09-25 DIAGNOSIS — F31.9 BIPOLAR 1 DISORDER (HCC): ICD-10-CM

## 2024-09-25 DIAGNOSIS — R53.81 DEBILITY: ICD-10-CM

## 2024-09-25 DIAGNOSIS — E78.00 HYPERCHOLESTEROLEMIA: ICD-10-CM

## 2024-09-25 DIAGNOSIS — F41.9 ANXIETY: ICD-10-CM

## 2024-09-25 DIAGNOSIS — D72.829 LEUKOCYTOSIS, UNSPECIFIED TYPE: Primary | ICD-10-CM

## 2024-09-25 DIAGNOSIS — J98.11 ATELECTASIS OF BOTH LUNGS: ICD-10-CM

## 2024-09-25 DIAGNOSIS — I10 HYPERTENSION, UNSPECIFIED TYPE: ICD-10-CM

## 2024-09-25 DIAGNOSIS — K21.9 GASTROESOPHAGEAL REFLUX DISEASE WITHOUT ESOPHAGITIS: ICD-10-CM

## 2024-09-25 DIAGNOSIS — N39.46 MIXED STRESS AND URGE URINARY INCONTINENCE: ICD-10-CM

## 2024-09-25 DIAGNOSIS — E87.6 HYPOKALEMIA: ICD-10-CM

## 2024-09-27 ENCOUNTER — OFFICE VISIT (OUTPATIENT)
Facility: CLINIC | Age: 74
End: 2024-09-27

## 2024-09-27 DIAGNOSIS — K21.9 GASTROESOPHAGEAL REFLUX DISEASE WITHOUT ESOPHAGITIS: ICD-10-CM

## 2024-09-27 DIAGNOSIS — E78.00 HYPERCHOLESTEROLEMIA: ICD-10-CM

## 2024-09-27 DIAGNOSIS — I10 HYPERTENSION, UNSPECIFIED TYPE: ICD-10-CM

## 2024-09-27 DIAGNOSIS — R53.81 DEBILITY: ICD-10-CM

## 2024-09-27 DIAGNOSIS — J98.11 ATELECTASIS OF BOTH LUNGS: ICD-10-CM

## 2024-09-27 DIAGNOSIS — E87.6 HYPOKALEMIA: ICD-10-CM

## 2024-09-27 DIAGNOSIS — F31.9 BIPOLAR 1 DISORDER (HCC): ICD-10-CM

## 2024-09-27 DIAGNOSIS — N39.46 MIXED STRESS AND URGE URINARY INCONTINENCE: ICD-10-CM

## 2024-09-27 DIAGNOSIS — D72.829 LEUKOCYTOSIS, UNSPECIFIED TYPE: Primary | ICD-10-CM

## 2024-09-27 DIAGNOSIS — F41.9 ANXIETY: ICD-10-CM

## 2024-09-30 ENCOUNTER — OFFICE VISIT (OUTPATIENT)
Facility: CLINIC | Age: 74
End: 2024-09-30
Payer: MEDICARE

## 2024-09-30 DIAGNOSIS — E78.00 HYPERCHOLESTEROLEMIA: ICD-10-CM

## 2024-09-30 DIAGNOSIS — F41.9 ANXIETY: ICD-10-CM

## 2024-09-30 DIAGNOSIS — I10 HYPERTENSION, UNSPECIFIED TYPE: ICD-10-CM

## 2024-09-30 DIAGNOSIS — E87.6 HYPOKALEMIA: ICD-10-CM

## 2024-09-30 DIAGNOSIS — F31.9 BIPOLAR 1 DISORDER (HCC): ICD-10-CM

## 2024-09-30 DIAGNOSIS — J98.11 ATELECTASIS OF BOTH LUNGS: ICD-10-CM

## 2024-09-30 DIAGNOSIS — N39.46 MIXED STRESS AND URGE URINARY INCONTINENCE: ICD-10-CM

## 2024-09-30 DIAGNOSIS — K21.9 GASTROESOPHAGEAL REFLUX DISEASE WITHOUT ESOPHAGITIS: ICD-10-CM

## 2024-09-30 DIAGNOSIS — D72.829 LEUKOCYTOSIS, UNSPECIFIED TYPE: Primary | ICD-10-CM

## 2024-09-30 DIAGNOSIS — R53.81 DEBILITY: ICD-10-CM

## 2024-09-30 PROCEDURE — 99309 SBSQ NF CARE MODERATE MDM 30: CPT

## 2024-09-30 PROCEDURE — 1123F ACP DISCUSS/DSCN MKR DOCD: CPT

## 2024-09-30 NOTE — PROGRESS NOTES
PLACE OF SERVICE:  [address] [address] [place] [address]    SKILLED VISIT      Chief Complaint:  ,abnormal labs .vomiting     HPI  Josefa Beltran (:  1950) is a 74 y.o. female, patient with history of gastric bypass bipolar state chronic kidney disease fibromyalgia gastroesophageal reflux disease hypertension hiatal hernia status post splenectomy but presented to emergency room for generalized weakness on 2024, history of pneumonia and PICC line placement with leukocytosis of 15.8, left pleural effusion questionable atelectasis versus pneumonia, her PICC line was removed before discharge, after stabilizing the hospital she comes to rehab for PT OT.    Seen patient in bed she is awake alert oriented x 2 today  mentation at baseline not in any acute distress.  Plan ice patient had episodes of vomiting x 2 days.  This morning Zofran has been ordered and abdominal x-ray has been ordered she remains afebrile no fever no temperature noted abdomen remains soft nondistended non tender.     CBC on 2024 indicated white blood count slightly increased at 12.7 they were 11.1 on 24 ,will order chest x ray  and lactic acid. She remains afebrile , no fever or temperature reported. Not in any acute distress.  hemoglobin 9.3 hematocrit 28.5 platelets 389 she continues to remain afebrile no fever no temperature noted at this time will order  her UA CNS to rule out UTI, is unremarkable and  no urinary symptoms reported today no flank pain reported today lungs diminished bilaterally no wheezing noted no shortness of breath no cough noted on today's exam not in any acute respiratory distress oxygen saturation is 98% on room air.   patient has bipolar moods currently stable on ziprasidone  and duloxetine.  Her anxiety stable  on hydroxyzine 25 mg every 6 hours as needed for anxiety mood remains stable  she has  restless leg syndrome patient is on ropinirole 1 mg at

## 2024-10-01 ENCOUNTER — OFFICE VISIT (OUTPATIENT)
Facility: CLINIC | Age: 74
End: 2024-10-01

## 2024-10-01 DIAGNOSIS — K21.9 GASTROESOPHAGEAL REFLUX DISEASE WITHOUT ESOPHAGITIS: ICD-10-CM

## 2024-10-01 DIAGNOSIS — J98.11 ATELECTASIS OF BOTH LUNGS: ICD-10-CM

## 2024-10-01 DIAGNOSIS — D72.829 LEUKOCYTOSIS, UNSPECIFIED TYPE: Primary | ICD-10-CM

## 2024-10-01 DIAGNOSIS — F31.9 BIPOLAR 1 DISORDER (HCC): ICD-10-CM

## 2024-10-01 DIAGNOSIS — Z76.89 ENCOUNTER FOR SOCIAL WORK INTERVENTION: Primary | ICD-10-CM

## 2024-10-01 DIAGNOSIS — I10 HYPERTENSION, UNSPECIFIED TYPE: ICD-10-CM

## 2024-10-01 DIAGNOSIS — E78.00 HYPERCHOLESTEROLEMIA: ICD-10-CM

## 2024-10-01 DIAGNOSIS — F41.9 ANXIETY: ICD-10-CM

## 2024-10-01 DIAGNOSIS — E87.6 HYPOKALEMIA: ICD-10-CM

## 2024-10-01 DIAGNOSIS — R53.81 DEBILITY: ICD-10-CM

## 2024-10-01 DIAGNOSIS — N39.46 MIXED STRESS AND URGE URINARY INCONTINENCE: ICD-10-CM

## 2024-10-01 NOTE — PROGRESS NOTES
Social Work Follow-up note     MSW met with Ms. Beltran and her friend, Josefa Josh.  Ms. Beltran was laying down in bed when MSW arrived.  She was alert and able to engage in conversation.  Patient was interested in pursuing ACP conversation with Josefa Moreno in the room.  The [place] [employer] Directive was completed.  The original was provided to family.      Identified Needs:   [place] [employer] Directive    Social Work Plan:  Complete [place] [employer] Directive    Internal/External organizations consulted/involved  Yin Hidalgo ([facility] ) was a witness for [place] [employer] directive.    Progress toward goals  Goal completed    Amparo Ortiz MSW, LMSW, [facility] [place]    Senior Care Services  [facility] Office Building   [address] [place] [address]  Cell [phone]

## 2024-10-01 NOTE — PROGRESS NOTES
reviewed in the facility remained stable continue verapamil no headache no dizziness no lightheadedness no vision changes reported.   continue Verapamil    Restless leg syndrome  Continue rOPINIRole    no falls precautions in place no falls reported in the facility     Mixed stress and urge urinary incontinence  UA CNS done in the facility showed urinalysis unremarkable cultures no growth.  No urinary symptoms reported today no flank pain no suprapubic pain   continue oxybutynin     Gastroesophageal reflux disease without esophagitis  No GI complains of nausea vomiting reported abdomen remains soft nondistended nontender continue omeprazole   Thrombocytosis  platelets 389 (9/27/24)  Continue on aspirin 81 mg daily  She remains clinically stable at this time  Hypokalemia  Potassium at the time of discharge in the hospital 3.4   Potassium levels on 9/25/2024 at 4.0  Follow up BMP ordered on 10/1/24.  Remains clinically stable .  Debility  Continue PT OT falls precautions in place no falls reported in the facility    BRIAN Caldera NP

## 2024-10-08 PROBLEM — N39.0 URINARY TRACT INFECTION: Status: RESOLVED | Noted: 2024-09-08 | Resolved: 2024-10-08

## 2024-10-11 ENCOUNTER — OFFICE VISIT (OUTPATIENT)
Age: 74
End: 2024-10-11
Payer: MEDICARE

## 2024-10-11 DIAGNOSIS — S52.021D CLOSED FRACTURE OF OLECRANON PROCESS OF RIGHT ULNA WITH ROUTINE HEALING, SUBSEQUENT ENCOUNTER: ICD-10-CM

## 2024-10-11 DIAGNOSIS — F31.81 BIPOLAR 2 DISORDER (HCC): ICD-10-CM

## 2024-10-11 DIAGNOSIS — R56.9 SEIZURE (HCC): ICD-10-CM

## 2024-10-11 DIAGNOSIS — F02.B0 MODERATE DEMENTIA ASSOCIATED WITH OTHER UNDERLYING DISEASE, WITHOUT BEHAVIORAL DISTURBANCE, PSYCHOTIC DISTURBANCE, MOOD DISTURBANCE, OR ANXIETY (HCC): ICD-10-CM

## 2024-10-11 DIAGNOSIS — N39.46 MIXED STRESS AND URGE URINARY INCONTINENCE: ICD-10-CM

## 2024-10-11 DIAGNOSIS — G25.81 RESTLESS LEG SYNDROME: ICD-10-CM

## 2024-10-11 DIAGNOSIS — R56.1 SEIZURE AFTER HEAD INJURY (HCC): ICD-10-CM

## 2024-10-11 DIAGNOSIS — S42.294D OTHER CLOSED NONDISPLACED FRACTURE OF PROXIMAL END OF RIGHT HUMERUS WITH ROUTINE HEALING, SUBSEQUENT ENCOUNTER: Primary | ICD-10-CM

## 2024-10-11 PROCEDURE — G8484 FLU IMMUNIZE NO ADMIN: HCPCS | Performed by: FAMILY MEDICINE

## 2024-10-11 PROCEDURE — 1123F ACP DISCUSS/DSCN MKR DOCD: CPT | Performed by: FAMILY MEDICINE

## 2024-10-11 PROCEDURE — 99306 1ST NF CARE HIGH MDM 50: CPT | Performed by: FAMILY MEDICINE

## 2024-10-11 NOTE — PROGRESS NOTES
10/11/2024  [facility]   Skilled Nursing Facility Admission History and physical examination  [facility]   Josefa Beltran (:  1950) is a 74 y.o. female,  with history of gastric bypass bipolar state chronic kidney disease fibromyalgia brain tumor status post craniotomy gastroesophageal reflux disease hypertension hiatal hernia status post splenectomy, restless leg syndrome hypertension hypercholesterolemia stage II chronic kidney disease asthma obesity generalized weakness debilitated state staph bacteremia in August aspiration pneumonia in 2024 recent COVID infection history of gastric bypass was recently transferred out to ED due to the fall out of bed while at current facility, Patient with recurrent hospital visit including  multiple other hospitalization for altered mental status changes pneumonia, patient was admitted on 2024 for chief complaint of fall out of bed and was discharged on October 10, 2024 with a discharge diagnosis of fall right humerus fracture waxing and waning the mentation bipolar state seizure disorder delayed cognition hypertension chronic kidney disease stage III asthma obesity bone spur on the bilateral feet obstructive sleep apnea generalized weakness overactive bladder, Patient seen and examined today,in bed comfortably   vital signs are reviewed in a stable range, baseline lab workup from the emergency room and hospital visit is at and is being in stable level, pain is nicely controlled,no sin tear, pt w/ foot drop, wrist drop, and rt humerus fx, awaiting to be see by ortho in 1-2 weeks, denies suicidal homicidal ideation has had no seizures since discharge, patient has been started on Lamictal, patient with cognitive abnormality with somnolence most likely postsurgical, patient is definitely challenging with multiple comorbid medical history with 1 year mortality rate of

## 2024-10-16 ENCOUNTER — OFFICE VISIT (OUTPATIENT)
Facility: CLINIC | Age: 74
End: 2024-10-16
Payer: MEDICARE

## 2024-10-16 DIAGNOSIS — I10 HYPERTENSION, UNSPECIFIED TYPE: ICD-10-CM

## 2024-10-16 DIAGNOSIS — S52.021D CLOSED FRACTURE OF OLECRANON PROCESS OF RIGHT ULNA WITH ROUTINE HEALING, SUBSEQUENT ENCOUNTER: ICD-10-CM

## 2024-10-16 DIAGNOSIS — E78.00 HYPERCHOLESTEROLEMIA: ICD-10-CM

## 2024-10-16 DIAGNOSIS — G25.81 RESTLESS LEG SYNDROME: ICD-10-CM

## 2024-10-16 DIAGNOSIS — N39.46 MIXED STRESS AND URGE URINARY INCONTINENCE: ICD-10-CM

## 2024-10-16 DIAGNOSIS — R53.81 DEBILITY: ICD-10-CM

## 2024-10-16 DIAGNOSIS — F31.81 BIPOLAR 2 DISORDER (HCC): ICD-10-CM

## 2024-10-16 DIAGNOSIS — R56.9 SEIZURE (HCC): ICD-10-CM

## 2024-10-16 DIAGNOSIS — F31.9 BIPOLAR 1 DISORDER (HCC): ICD-10-CM

## 2024-10-16 DIAGNOSIS — K21.9 GASTROESOPHAGEAL REFLUX DISEASE WITHOUT ESOPHAGITIS: ICD-10-CM

## 2024-10-16 DIAGNOSIS — S42.294D OTHER CLOSED NONDISPLACED FRACTURE OF PROXIMAL END OF RIGHT HUMERUS WITH ROUTINE HEALING, SUBSEQUENT ENCOUNTER: Primary | ICD-10-CM

## 2024-10-16 DIAGNOSIS — F41.9 ANXIETY: ICD-10-CM

## 2024-10-16 PROCEDURE — 99310 SBSQ NF CARE HIGH MDM 45: CPT

## 2024-10-16 PROCEDURE — G8484 FLU IMMUNIZE NO ADMIN: HCPCS

## 2024-10-16 PROCEDURE — 1123F ACP DISCUSS/DSCN MKR DOCD: CPT

## 2024-10-18 ENCOUNTER — OFFICE VISIT (OUTPATIENT)
Facility: CLINIC | Age: 74
End: 2024-10-18

## 2024-10-18 DIAGNOSIS — K21.9 GASTROESOPHAGEAL REFLUX DISEASE WITHOUT ESOPHAGITIS: ICD-10-CM

## 2024-10-18 DIAGNOSIS — N39.46 MIXED STRESS AND URGE URINARY INCONTINENCE: ICD-10-CM

## 2024-10-18 DIAGNOSIS — S52.021D CLOSED FRACTURE OF OLECRANON PROCESS OF RIGHT ULNA WITH ROUTINE HEALING, SUBSEQUENT ENCOUNTER: Primary | ICD-10-CM

## 2024-10-18 DIAGNOSIS — F31.81 BIPOLAR 2 DISORDER (HCC): ICD-10-CM

## 2024-10-18 DIAGNOSIS — S42.294D OTHER CLOSED NONDISPLACED FRACTURE OF PROXIMAL END OF RIGHT HUMERUS WITH ROUTINE HEALING, SUBSEQUENT ENCOUNTER: ICD-10-CM

## 2024-10-18 DIAGNOSIS — R53.81 DEBILITY: ICD-10-CM

## 2024-10-18 DIAGNOSIS — F41.9 ANXIETY: ICD-10-CM

## 2024-10-18 DIAGNOSIS — I10 HYPERTENSION, UNSPECIFIED TYPE: ICD-10-CM

## 2024-10-18 DIAGNOSIS — R56.9 SEIZURE (HCC): ICD-10-CM

## 2024-10-18 DIAGNOSIS — G25.81 RESTLESS LEG SYNDROME: ICD-10-CM

## 2024-10-18 NOTE — PROGRESS NOTES
PERRLA;   Ears/nose/mouth/throat:mmm, OP clear, trachea midline;  Cardiovascular: RRR,nml S1 and S2, no rubs murmurs or gallops, no edema, no cyanosis;   Respiratory: diminished  to auscultation, symmetric, no respiratory distress;  Gastrointestinal: Abdomen soft, NT, ND, no masses, normal bowel sounds;  Neurologic: Cranial nerves II through VII grossly intact, no speech or motor deficits A&O in time place and person  Skin: No rash, warm and dry;  Musculoskeletal: No erythema swelling or joint tenderness, extremities without cyanosis, neck supple, ROM intact spine and extremities;  Psychiatric: Not agitated.  Appropriate affect, mood, judgment and insight.  Genitourinary: No suprapubic tenderness or flank tenderness  Heme, lymph, immuno: No pallor;           Assessment/Plans:   Other closed nondisplaced fracture of proximal end of right humerus with routine healing, subsequent encounter  , this morning patient's surgical site soaked in blood patient was in pain elbow examined and was noted to be swollen with screw /plate protruding from her elbow patient was in pain unable to move her right arm. No falls /injuries reported .  Primary nurse requested to call 911 and sent patient to the emergency room for further evaluation.   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  Fall cautions in place no falls reported in the facility  Bipolar 2 disorder (HCC)  bipolar state stable no suicidal homicidal

## 2024-11-11 ENCOUNTER — HOSPITAL ENCOUNTER (OUTPATIENT)
Facility: HOSPITAL | Age: 74
Setting detail: SPECIMEN
Discharge: HOME | End: 2024-11-14

## 2024-11-11 LAB
ABO + RH BLD: NORMAL
BLOOD GROUP ANTIBODIES SERPL: NORMAL
HISTORY CHECK: NORMAL
SPECIMEN EXP DATE BLD: NORMAL

## 2024-11-11 PROCEDURE — 86850 RBC ANTIBODY SCREEN: CPT

## 2024-11-11 PROCEDURE — 86901 BLOOD TYPING SEROLOGIC RH(D): CPT

## 2024-11-11 PROCEDURE — 86900 BLOOD TYPING SEROLOGIC ABO: CPT
